# Patient Record
Sex: MALE | Race: WHITE | URBAN - METROPOLITAN AREA
[De-identification: names, ages, dates, MRNs, and addresses within clinical notes are randomized per-mention and may not be internally consistent; named-entity substitution may affect disease eponyms.]

---

## 2017-02-27 ENCOUNTER — DOCUMENTATION ONLY (OUTPATIENT)
Dept: LAB | Facility: CLINIC | Age: 82
End: 2017-02-27

## 2017-02-27 DIAGNOSIS — E11.65 TYPE 2 DIABETES MELLITUS WITH HYPERGLYCEMIA, WITH LONG-TERM CURRENT USE OF INSULIN (H): Primary | ICD-10-CM

## 2017-02-27 DIAGNOSIS — Z79.4 TYPE 2 DIABETES MELLITUS WITH HYPERGLYCEMIA, WITH LONG-TERM CURRENT USE OF INSULIN (H): Primary | ICD-10-CM

## 2017-02-27 NOTE — PROGRESS NOTES
A1C Labs pended. Routed to Dr. Santana to review and order.    Patient has appointment with  2/28/17 for a diabetic check.    Last office visit with Dr. Vizcaino 11/8/16 patient was told to follow up in 3 months for diabetic care.    Bernadine Lux RN,   [facility]

## 2017-02-28 ENCOUNTER — TELEPHONE (OUTPATIENT)
Dept: PHARMACY | Facility: OTHER | Age: 82
End: 2017-02-28

## 2017-02-28 ENCOUNTER — OFFICE VISIT (OUTPATIENT)
Dept: PEDIATRICS | Facility: CLINIC | Age: 82
End: 2017-02-28
Payer: COMMERCIAL

## 2017-02-28 VITALS
SYSTOLIC BLOOD PRESSURE: 123 MMHG | BODY MASS INDEX: 37.58 KG/M2 | HEIGHT: 69 IN | OXYGEN SATURATION: 96 % | WEIGHT: 253.7 LBS | HEART RATE: 68 BPM | TEMPERATURE: 96.6 F | DIASTOLIC BLOOD PRESSURE: 68 MMHG

## 2017-02-28 DIAGNOSIS — Z79.4 TYPE 2 DIABETES MELLITUS WITH HYPOGLYCEMIA WITHOUT COMA, WITH LONG-TERM CURRENT USE OF INSULIN (H): Primary | ICD-10-CM

## 2017-02-28 DIAGNOSIS — E11.65 TYPE 2 DIABETES MELLITUS WITH HYPERGLYCEMIA, WITH LONG-TERM CURRENT USE OF INSULIN (H): ICD-10-CM

## 2017-02-28 DIAGNOSIS — E11.649 TYPE 2 DIABETES MELLITUS WITH HYPOGLYCEMIA WITHOUT COMA, WITH LONG-TERM CURRENT USE OF INSULIN (H): Primary | ICD-10-CM

## 2017-02-28 DIAGNOSIS — Z79.4 TYPE 2 DIABETES MELLITUS WITH HYPERGLYCEMIA, WITH LONG-TERM CURRENT USE OF INSULIN (H): ICD-10-CM

## 2017-02-28 LAB
ANION GAP SERPL CALCULATED.3IONS-SCNC: 4 MMOL/L (ref 3–14)
BUN SERPL-MCNC: 27 MG/DL (ref 7–30)
CALCIUM SERPL-MCNC: 8.5 MG/DL (ref 8.5–10.1)
CHLORIDE SERPL-SCNC: 111 MMOL/L (ref 94–109)
CO2 SERPL-SCNC: 27 MMOL/L (ref 20–32)
CREAT SERPL-MCNC: 1.43 MG/DL (ref 0.66–1.25)
GFR SERPL CREATININE-BSD FRML MDRD: 47 ML/MIN/1.7M2
GLUCOSE SERPL-MCNC: 56 MG/DL (ref 70–99)
HBA1C MFR BLD: 6.4 % (ref 4.3–6)
POTASSIUM SERPL-SCNC: 4.1 MMOL/L (ref 3.4–5.3)
SODIUM SERPL-SCNC: 142 MMOL/L (ref 133–144)

## 2017-02-28 PROCEDURE — 83036 HEMOGLOBIN GLYCOSYLATED A1C: CPT | Performed by: NURSE PRACTITIONER

## 2017-02-28 PROCEDURE — 99213 OFFICE O/P EST LOW 20 MIN: CPT | Performed by: INTERNAL MEDICINE

## 2017-02-28 PROCEDURE — 80048 BASIC METABOLIC PNL TOTAL CA: CPT | Performed by: NURSE PRACTITIONER

## 2017-02-28 PROCEDURE — 36415 COLL VENOUS BLD VENIPUNCTURE: CPT | Performed by: NURSE PRACTITIONER

## 2017-02-28 RX ORDER — METFORMIN HYDROCHLORIDE 750 MG/1
750 TABLET, EXTENDED RELEASE ORAL
Qty: 180 TABLET | Refills: 3
Start: 2017-02-28 | End: 2017-11-01

## 2017-02-28 NOTE — PROGRESS NOTES
"  SUBJECTIVE:                                                    Alex Armenta is a 82 year old male who presents to clinic today for the following health issues:  =    Diabetes Follow-up    Patient is checking blood sugars: twice daily.    Blood sugar testing frequency justification: Uncontrolled diabetes  Results are as follows: Pt. States it will vary. Pt. States if evening it is 120 and afternoon it will be higher due to high intake of carbs. Pt. States there is times when it is in the 70's.    Diabetic concerns: None and low blood sugar several less than 70 in the past few weeks     Symptoms of hypoglycemia (low blood sugar): shaky, dizzy, blurred vision, confusion, Symptoms occur if sugars < 80.     Paresthesias (numbness or burning in feet) or sores: Yes pt. States it will vary and be numb sometimes      Date of last diabetic eye exam: April 2016       Amount of exercise or physical activity: 6-7 days/week for an average of 45-60 minutes    Problems taking medications regularly: Yes,  Pt. States he takes them just forgetting to take them on time     Medication side effects: Pt. States he thinks it affects his hearing   Diet: regular (no restrictions), pt. States he does try to monitor carbs and sugars      HPI: My first concern on this patient was that his  Blood sugar this morning is 56. He was not symptomatic. He took his insulin this morning but has not eaten breakfast because of the scheduled blood draw I immediately had the patient eat breakfast.     He came in today for advice on his diabetes management as his doctors is on leave.             OBJECTIVE:                                                    /68 (BP Location: Right arm, Patient Position: Chair, Cuff Size: Adult Large)  Pulse 68  Temp 96.6  F (35.9  C) (Oral)  Ht 5' 8.75\" (1.746 m)  Wt 253 lb 11.2 oz (115.1 kg)  SpO2 96%  BMI 37.74 kg/m2  Body mass index is 37.74 kg/(m^2).      Diagnostic Test Results:  Results for orders placed or " performed in visit on 02/28/17 (from the past 24 hour(s))   Hemoglobin A1c   Result Value Ref Range    Hemoglobin A1C 6.4 (H) 4.3 - 6.0 %   Basic metabolic panel   Result Value Ref Range    Sodium 142 133 - 144 mmol/L    Potassium 4.1 3.4 - 5.3 mmol/L    Chloride 111 (H) 94 - 109 mmol/L    Carbon Dioxide 27 20 - 32 mmol/L    Anion Gap 4 3 - 14 mmol/L    Glucose 56 (L) 70 - 99 mg/dL    Urea Nitrogen 27 7 - 30 mg/dL    Creatinine 1.43 (H) 0.66 - 1.25 mg/dL    GFR Estimate 47 (L) >60 mL/min/1.7m2    GFR Estimate If Black 57 (L) >60 mL/min/1.7m2    Calcium 8.5 8.5 - 10.1 mg/dL        ASSESSMENT/PLAN:                                                        ICD-10-CM    1. Type 2 diabetes mellitus with hypoglycemia without coma, with long-term current use of insulin (H) E11.649     Z79.4    2. Type 2 diabetes mellitus with hyperglycemia, with long-term current use of insulin (H) E11.65 metFORMIN (GLUCOPHAGE-XR) 750 MG 24 hr tablet    Z79.4      Need to improve his diabetes management because he's having episodes of hypoglycemia. I'm decreasing the metformin to once a day and decreasing the dose of Humalog to 10 units before meals rather than 15. On the chart it says he takes Lantus 90 but the patient informed me he only takes 80. Referral made to medical therapy management. More than 50% of the time was spent on education for the above problems and management plans                Riki Santana MD  [facility]

## 2017-02-28 NOTE — TELEPHONE ENCOUNTER
MTM referral from: [facility] visit (referral by provider)    MTM referral outreach attempt #1 on February 28, 2017 at 12:35 PM      Outcome: Patient is not interested at this time because he feels it is not necessary at this time, will route to MTM Pharmacist/Provider as an FYI. Thank you for the referral.     Dennise Lai, MTM Coordinator Intern

## 2017-02-28 NOTE — MR AVS SNAPSHOT
After Visit Summary   2/28/2017    Alex Armenta    MRN: [id number]           Patient Information     Date Of Birth          1935        Visit Information        Provider Department      2/28/2017 9:20 AM Riki Santana MD [facility]        Today's Diagnoses     Type 2 diabetes mellitus with hypoglycemia without coma, with long-term current use of insulin (H)    -  1    Type 2 diabetes mellitus with hyperglycemia, with long-term current use of insulin (H)           Follow-ups after your visit        Additional Services     MED THERAPY MANAGE REFERRAL       Your provider has referred you to: **[facility] Scheduling (numerous locations) [phone]   [email or web address]  [facility]: [facility] [phone]   [email or web address]    Reason for Referral: Diabetes    The [facility] department will contact you to schedule an appointment.  You may also schedule the appointment by calling [phone].  For [facility] patients, please call [phone] to confirm/schedule your appointment on the next business day.    This service is designed to help you get the most from your medications.  A specially trained Pharmacist will work closely with you and your providers to solve any questions, concerns, issues or problems related to your medications.    Please bring all of your prescription and non-prescription medications (such as vitamins, over-the-counter medications, and herbals) or a detailed medication list to your appointment.    If you have a glucose meter or other home monitoring information, please also bring this to your appointment (i.e. blood glucose log, blood pressure log, pain log, etc.).                  Who to contact     If you have questions or need follow up information about today's  "clinic visit or your schedule please contact [facility] directly at [phone].  Normal or non-critical lab and imaging results will be communicated to you by [employer], letter or phone within 4 business days after the clinic has received the results. If you do not hear from us within 7 days, please contact the clinic through [employer] or phone. If you have a critical or abnormal lab result, we will notify you by phone as soon as possible.  Submit refill requests through [employer] or call your pharmacy and they will forward the refill request to us. Please allow 3 business days for your refill to be completed.          Additional Information About Your Visit        [employer] Information     [employer] is an electronic gateway that provides easy, online access to your medical records. With [employer], you can request a clinic appointment, read your test results, renew a prescription or communicate with your care team.     To sign up for [employer] visit the website at [email or web address]   You will be asked to enter the access code listed below, as well as some personal information. Please follow the directions to create your username and password.     Your access code is: [email or web address]  Expires: 2017 10:40 AM     Your access code will  in 90 days. If you need help or a new code, please contact your [facility] or call [phone] for assistance.        Care EveryWhere ID     This is your Care EveryWhere ID. This could be used by other organizations to access your [place] medical records  [id number]        Your Vitals Were     Pulse Temperature Height Pulse Oximetry BMI (Body Mass Index)       68 96.6  F (35.9  C) (Oral) 5' 8.75\" (1.746 m) 96% 37.74 kg/m2        Blood Pressure from Last 3 Encounters:   17 123/68   16 112/68   16 120/56    Weight from Last 3 Encounters:   17 253 lb 11.2 oz (115.1 kg)   16 249 lb (112.9 kg)   16 " 251 lb 3.2 oz (113.9 kg)              We Performed the Following     MED THERAPY MANAGE REFERRAL          Today's Medication Changes          These changes are accurate as of: 2/28/17 10:40 AM.  If you have any questions, ask your nurse or doctor.               These medicines have changed or have updated prescriptions.        Dose/Directions    metFORMIN 750 MG 24 hr tablet   Commonly known as:  GLUCOPHAGE-XR   This may have changed:  when to take this   Used for:  Type 2 diabetes mellitus with hyperglycemia, with long-term current use of insulin (H)   Changed by:  Riki Santana MD        Dose:  750 mg   Take 1 tablet (750 mg) by mouth daily (with breakfast)   Quantity:  180 tablet   Refills:  3            Where to get your medicines      Some of these will need a paper prescription and others can be bought over the counter.  Ask your nurse if you have questions.     You don't need a prescription for these medications     metFORMIN 750 MG 24 hr tablet                Primary Care Provider Office Phone # Fax #    Anibal Vizcaino [phone]       [facility] [address] [place] [id number]        Thank you!     Thank you for choosing [facility]  for your care. Our goal is always to provide you with excellent care. Hearing back from our patients is one way we can continue to improve our services. Please take a few minutes to complete the written survey that you may receive in the mail after your visit with us. Thank you!             Your Updated Medication List - Protect others around you: Learn how to safely use, store and throw away your medicines at www.disposemymeds.org.          This list is accurate as of: 2/28/17 10:40 AM.  Always use your most recent med list.                   Brand Name Dispense Instructions for use    aspirin 81 MG tablet     100 tablet    Take 1 tablet by mouth daily.       BLOOD GLUCOSE TEST STRIPS STRP     100 Strip    One  Touch Ultra-test 3 times daily (on insulin)       enalapril 20 MG tablet    VASOTEC    90 tablet    TAKE 1 TABLET BY MOUTH ONCE DAILY       * insulin lispro 100 UNIT/ML injection    HumaLOG    3 Month    15 units before breakfast, 15 units before lunch, 15 units before dinner       * HumaLOG KWIKpen 100 UNIT/ML injection   Generic drug:  insulin lispro     3 Month    INJECT 15 UNITS BEFORE BREAKFAST, 15 UNITS BEFORE LUNCH, 15 UNITS BEFORE DINNER       insulin pen needle 30G X 8 MM    NOVOFINE    400 each    1 Units 4 times daily       LANTUS SOLOSTAR 100 UNIT/ML injection   Generic drug:  insulin glargine     3 Month    90 units every 24 hours       metFORMIN 750 MG 24 hr tablet    GLUCOPHAGE-XR    180 tablet    Take 1 tablet (750 mg) by mouth daily (with breakfast)       Multi-vitamin Tabs tablet      Take 1 tablet by mouth daily       * blood glucose monitoring test strip    no brand specified     Use 3 times a day to test blood sugars.       * ONE TOUCH ULTRA test strip   Generic drug:  blood glucose monitoring     100 each    USE 1 STRIP TO TEST 4 TIMES DAILY       order for DME     300 each    Equipment being ordered: Lancets of choice for diabetic testing       * order for DME     1 Units    Equipment being ordered:   New home glucometer of choice Test strips for new glucometer for three times daily monitoring--on insulin therapy  Anibal Vizcaino MD       * order for DME     400 strip    Equipment being ordered: One Touch Ultra Glucometer Test Strips ( on insulin therapy )  To be used four times daily for glucose monitoring.       * order for DME     300 Units    Equipment being ordered: DELICA  Lancets--uses three time daily       pravastatin 10 MG tablet    PRAVACHOL    90 tablet    TAKE 1 TABLET BY MOUTH ONCE DAILY       * Notice:  This list has 7 medication(s) that are the same as other medications prescribed for you. Read the directions carefully, and ask your doctor or other care provider to review them  with you.

## 2017-05-03 ENCOUNTER — TELEPHONE (OUTPATIENT)
Dept: FAMILY MEDICINE | Facility: CLINIC | Age: 82
End: 2017-05-03

## 2017-05-03 NOTE — TELEPHONE ENCOUNTER
Reason for call:  Form  Reason for Call:  Form, our goal is to have forms completed with 72 hours, however, some forms may require a visit or additional information.    Type of letter, form or note:  medical    Who is the form from?: Patient    Where did the form come from: Patient or family brought in       What clinic location was the form placed at?: [facility] - [phone]    Where the form was placed: [facility]    What number is listed as a contact on the form?: Phone [phone]       Additional comments: form to be filled out and mailed back to [place] [employer]    Call taken on 5/3/2017 at 1:03 PM by Janna Escobar

## 2017-05-04 NOTE — TELEPHONE ENCOUNTER
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 Using starting date for insulin 7/2008 based on records. Given 4 years with n o reported incidents.    Anibal Vizcaino MD

## 2017-05-23 ENCOUNTER — TELEPHONE (OUTPATIENT)
Dept: FAMILY MEDICINE | Facility: CLINIC | Age: 82
End: 2017-05-23

## 2017-05-23 DIAGNOSIS — E78.5 HYPERLIPIDEMIA LDL GOAL <100: ICD-10-CM

## 2017-05-23 NOTE — TELEPHONE ENCOUNTER
Pravastatin sodium 10 mg tab     Last Written Prescription Date: 05-03-16  Last Fill Quantity: 90, # refills: 3  Last Office Visit with G, [facility] or [facility] prescribing provider: 11-8-16       Lab Results   Component Value Date    CHOL 105 11/08/2016     Lab Results   Component Value Date    HDL 38 11/08/2016     Lab Results   Component Value Date    LDL 48 11/08/2016     Lab Results   Component Value Date    TRIG 97 11/08/2016     Lab Results   Component Value Date    CHOLHDLRATIO 3.0 08/11/2015

## 2017-05-25 RX ORDER — PRAVASTATIN SODIUM 10 MG
10 TABLET ORAL DAILY
Qty: 90 TABLET | Refills: 0 | Status: SHIPPED | OUTPATIENT
Start: 2017-05-25 | End: 2017-05-25

## 2017-05-25 RX ORDER — PRAVASTATIN SODIUM 10 MG
10 TABLET ORAL DAILY
Qty: 90 TABLET | Refills: 2 | Status: SHIPPED | OUTPATIENT
Start: 2017-05-25 | End: 2018-07-16

## 2017-05-25 NOTE — TELEPHONE ENCOUNTER
Medication is being filled for 1 time refill only due to:  Patient needs to be seen because due for Diabetic OV.     Please assist in scheduling.  Katina Stokes RN, BSN

## 2017-05-25 NOTE — TELEPHONE ENCOUNTER
Patient informed.  He stated he had a diabetic follow up appt with Dr. Santana in  on 2/28/2017     Provider, please review and send new Rx if appropriate.

## 2017-06-09 ENCOUNTER — TELEPHONE (OUTPATIENT)
Dept: FAMILY MEDICINE | Facility: CLINIC | Age: 82
End: 2017-06-09

## 2017-06-09 DIAGNOSIS — E11.65 TYPE 2 DIABETES MELLITUS WITH HYPERGLYCEMIA, WITH LONG-TERM CURRENT USE OF INSULIN (H): Primary | ICD-10-CM

## 2017-06-09 DIAGNOSIS — I10 HYPERTENSION GOAL BP (BLOOD PRESSURE) < 140/80: ICD-10-CM

## 2017-06-09 DIAGNOSIS — Z79.4 TYPE 2 DIABETES MELLITUS WITH HYPERGLYCEMIA, WITH LONG-TERM CURRENT USE OF INSULIN (H): Primary | ICD-10-CM

## 2017-06-09 RX ORDER — ENALAPRIL MALEATE 20 MG/1
20 TABLET ORAL DAILY
Qty: 90 TABLET | Refills: 2 | Status: CANCELLED | OUTPATIENT
Start: 2017-06-09

## 2017-06-09 NOTE — TELEPHONE ENCOUNTER
insulin pen needle (NOVOFINE) 30G X 8 MM         Last Written Prescription Date: 1/16/2015  Last Fill Quantity: 400each, # refills: 3  Last Office Visit with G, P or [facility] prescribing provider:  11/8/2016        BP Readings from Last 3 Encounters:   02/28/17 123/68   11/08/16 112/68   07/07/16 120/56     Lab Results   Component Value Date    MICROL 184 11/08/2016     Lab Results   Component Value Date    UMALCR 137.31 11/08/2016     Creatinine   Date Value Ref Range Status   02/28/2017 1.43 (H) 0.66 - 1.25 mg/dL Final   ]  GFR Estimate   Date Value Ref Range Status   02/28/2017 47 (L) >60 mL/min/1.7m2 Final     Comment:     Non  GFR Calc   11/08/2016 54 (L) >60 mL/min/1.7m2 Final     Comment:     Non  GFR Calc   07/07/2016 48 (L) >60 mL/min/1.7m2 Final     Comment:     Non  GFR Calc     GFR Estimate If Black   Date Value Ref Range Status   02/28/2017 57 (L) >60 mL/min/1.7m2 Final     Comment:      GFR Calc   11/08/2016 65 >60 mL/min/1.7m2 Final     Comment:      GFR Calc   07/07/2016 58 (L) >60 mL/min/1.7m2 Final     Comment:      GFR Calc     Lab Results   Component Value Date    CHOL 105 11/08/2016     Lab Results   Component Value Date    HDL 38 11/08/2016     Lab Results   Component Value Date    LDL 48 11/08/2016     Lab Results   Component Value Date    TRIG 97 11/08/2016     Lab Results   Component Value Date    CHOLHDLRATIO 3.0 08/11/2015     Lab Results   Component Value Date    AST 16 11/08/2016     Lab Results   Component Value Date    ALT 24 11/08/2016     Lab Results   Component Value Date    A1C 6.4 02/28/2017    A1C 6.1 11/08/2016    A1C 7.0 07/07/2016    A1C 6.6 03/17/2016    A1C 6.6 11/23/2015     Potassium   Date Value Ref Range Status   02/28/2017 4.1 3.4 - 5.3 mmol/L Final

## 2017-06-09 NOTE — TELEPHONE ENCOUNTER
enalapril (VASOTEC) 20 MG tablet      Last Written Prescription Date: 08-12-16  Last Fill Quantity: 90, # refills: 2  Last Office Visit with G, P or [facility] prescribing provider: 11-08-16       Potassium   Date Value Ref Range Status   02/28/2017 4.1 3.4 - 5.3 mmol/L Final     Creatinine   Date Value Ref Range Status   02/28/2017 1.43 (H) 0.66 - 1.25 mg/dL Final     BP Readings from Last 3 Encounters:   02/28/17 123/68   11/08/16 112/68   07/07/16 120/56

## 2017-06-12 RX ORDER — ENALAPRIL MALEATE 20 MG/1
20 TABLET ORAL DAILY
Qty: 90 TABLET | Refills: 2 | Status: SHIPPED | OUTPATIENT
Start: 2017-06-12 | End: 2018-04-02

## 2017-06-12 NOTE — TELEPHONE ENCOUNTER
enalapril (VASOTEC) 20 MG tablet    Potassium   Date Value Ref Range Status   02/28/2017 4.1 3.4 - 5.3 mmol/L Final     Creatinine   Date Value Ref Range Status   02/28/2017 1.43 (H) 0.66 - 1.25 mg/dL Final     BP Readings from Last 3 Encounters:   02/28/17 123/68   11/08/16 112/68   07/07/16 120/56     Routing refill request to provider for review/approval because:  Labs out of range:  Creatinine  Katina Stokes RN, BSN       insulin pen needle (NOVOFINE) 30G X 8 MM       Prescription approved per [facility] Refill Protocol.  Katina Stokes RN, BSN

## 2017-06-12 NOTE — TELEPHONE ENCOUNTER
Pt calling for follow up on refills of below insulin needle and the Enalapril please advise and contact pt in regards. [phone]

## 2017-07-12 DIAGNOSIS — E11.21 TYPE 2 DIABETES MELLITUS WITH DIABETIC NEPHROPATHY, WITHOUT LONG-TERM CURRENT USE OF INSULIN (H): ICD-10-CM

## 2017-07-12 NOTE — TELEPHONE ENCOUNTER
BLOOD GLUCOSE TEST STRIPS STRP      Last Written Prescription Date: 04/07/10  Last Fill Quantity: 100,  # refills: prn   Last Office Visit with G, P or [facility] prescribing provider: 02/28/17

## 2017-07-12 NOTE — TELEPHONE ENCOUNTER
ONE TOUCH ULTRA test strip         Last Written Prescription Date: 12/5/2016  Last Fill Quantity: 100, # refills: 2  Last Office Visit with G, P or [facility] prescribing provider:  11/8/2016        BP Readings from Last 3 Encounters:   02/28/17 123/68   11/08/16 112/68   07/07/16 120/56     Lab Results   Component Value Date    MICROL 184 11/08/2016     Lab Results   Component Value Date    UMALCR 137.31 11/08/2016     Creatinine   Date Value Ref Range Status   02/28/2017 1.43 (H) 0.66 - 1.25 mg/dL Final   ]  GFR Estimate   Date Value Ref Range Status   02/28/2017 47 (L) >60 mL/min/1.7m2 Final     Comment:     Non  GFR Calc   11/08/2016 54 (L) >60 mL/min/1.7m2 Final     Comment:     Non  GFR Calc   07/07/2016 48 (L) >60 mL/min/1.7m2 Final     Comment:     Non  GFR Calc     GFR Estimate If Black   Date Value Ref Range Status   02/28/2017 57 (L) >60 mL/min/1.7m2 Final     Comment:      GFR Calc   11/08/2016 65 >60 mL/min/1.7m2 Final     Comment:      GFR Calc   07/07/2016 58 (L) >60 mL/min/1.7m2 Final     Comment:      GFR Calc     Lab Results   Component Value Date    CHOL 105 11/08/2016     Lab Results   Component Value Date    HDL 38 11/08/2016     Lab Results   Component Value Date    LDL 48 11/08/2016     Lab Results   Component Value Date    TRIG 97 11/08/2016     Lab Results   Component Value Date    CHOLHDLRATIO 3.0 08/11/2015     Lab Results   Component Value Date    AST 16 11/08/2016     Lab Results   Component Value Date    ALT 24 11/08/2016     Lab Results   Component Value Date    A1C 6.4 02/28/2017    A1C 6.1 11/08/2016    A1C 7.0 07/07/2016    A1C 6.6 03/17/2016    A1C 6.6 11/23/2015     Potassium   Date Value Ref Range Status   02/28/2017 4.1 3.4 - 5.3 mmol/L Final

## 2017-07-14 NOTE — TELEPHONE ENCOUNTER
BLOOD GLUCOSE TEST STRIPS STRP      Prescription approved per [facility] Refill Protocol.  Katina Stokes RN, BSN

## 2017-08-02 NOTE — PROGRESS NOTES
SUBJECTIVE:                                                    Alex Armenta is a 82 year old male who presents to clinic today for the following health issues:      Diabetes Follow-up--reviewed his current insulin doses. Is on a reduced dose of Metformin. This is due to renal insufficiency. Tolerating medications well       Patient is checking blood sugars: twice daily.    Blood sugar testing frequency justification: Frequent hypoglycemia  Results are as follows:       Morning, noon, evening        Diabetic concerns: None     Symptoms of hypoglycemia (low blood sugar): none     Paresthesias (numbness or burning in feet) or sores: Yes in fingers, little bit in feet     Date of last diabetic eye exam: April    Hyperlipidemia Follow-Up      Rate your low fat/cholesterol diet?: fair    Taking statin?  Yes, Does get some muscle aches    Other lipid medications/supplements?:  none    Hypertension Follow-up      Outpatient blood pressures are not being checked.    Low Salt Diet: not monitoring salt      Chronic Kidney Disease Follow-up      Current NSAID use?  No          Amount of exercise or physical activity: 4-5 days/week for an average of 45-60 minutes    Problems taking medications regularly: No    Medication side effects: none  Diet: Diabetic diet, no added salt.          Problem list and histories reviewed & adjusted, as indicated.  Additional history: as documented        Reviewed and updated as needed this visit by clinical staff     Reviewed and updated as needed this visit by Provider         ROS:  C: NEGATIVE for fever, chills, change in weight  CONSTITUTIONAL:POSITIVE  for fatigue likely related to chronic anemia secondary to myelodysplastic syndrome  I: NEGATIVE for worrisome rashes, moles or lesions  E: NEGATIVE for vision changes or irritation  E/M: NEGATIVE for ear, mouth and throat problems  R: NEGATIVE for significant cough or SOB  RESP: Reports some exertional fatigue and shortness of breath without  "chest pain. Feel this is likely secondary to his anemia.  B: NEGATIVE for masses, tenderness or discharge  CV: NEGATIVE for chest pain, palpitations or peripheral edema  CV: Previously noted heart murmur but with echocardiogram 5 years ago showing no valvular abnormalities. No exertional chest pain.  GI: NEGATIVE for nausea, abdominal pain, heartburn, or change in bowel habits  : NEGATIVE for frequency, dysuria, or hematuria   male : His chronic renal disease, stage III B.  M: NEGATIVE for significant arthralgias or myalgia  N: NEGATIVE for weakness, dizziness or paresthesias  NEURO: Patient is hard of hearing but otherwise mentation appears normal and cognition appears normal. No suggestion of cognitive decline.  E: NEGATIVE for temperature intolerance, skin/hair changes  ENDOCRINE: On insulin, basal and pre-meal  H: NEGATIVE for bleeding problems  HEME/ALLERGY/IMMUNE: Long-term anemia with macrocytosis, suggestive of a myelodysplastic problem. He has seen a hematologist in the past but did not complete a bone marrow study. At each visit is offered to be referred back for reevaluation.  P: NEGATIVE for changes in mood or affect  PSYCHIATRIC: Some mild depression due to the loss of his wife in number of years ago. Patient is still functioning and not requiring medications.    OBJECTIVE:                                                    /70  Pulse 78  Temp 98.2  F (36.8  C) (Temporal)  Resp 12  Ht 5' 8.5\" (1.74 m)  Wt 251 lb 1.6 oz (113.9 kg)  BMI 37.62 kg/m2  Body mass index is 37.62 kg/(m^2).  GENERAL: alert, no distress, cooperative and pale  EYES: Eyes grossly normal to inspection, extraocular movements - intact, and PERRL  HENT: ear canals- normal; TMs- normal; Nose- normal; Mouth- no ulcers, no lesions  HENT: Significant hearing loss but functioning reasonably well  NECK: no tenderness, no adenopathy, no asymmetry, no masses, no stiffness; thyroid- normal to palpation  NECK: No carotid bruits " noted  RESP: lungs clear to auscultation - no rales, no rhonchi, no wheezes  BREAST: no masses, no tenderness, no nipple discharge, no palpable axillary masses or adenopathy  CV: regular rates and rhythm, normal S1 S2, no S3 or S4, no irregular beats, no bruits heard and grade 1-2/6 soft systolic murmur heard best over the base of the heart and left sternal border  ABDOMEN: soft, no tenderness, no  hepatosplenomegaly, no masses, normal bowel sounds  MS: extremities- no gross deformities noted, no edema  SKIN: no suspicious lesions, no rashes  NEURO: strength and tone- normal, sensory exam- grossly normal, mentation- intact, speech- normal, reflexes- symmetric  BACK: no CVA tenderness, no paralumbar tenderness  PSYCH: Alert and oriented times 3; speech- coherent , normal rate and volume; able to articulate logical thoughts, able to abstract reason, no tangential thoughts, no hallucinations or delusions, affect- normal  LYMPHATICS: ant. cervical- normal, post. cervical- normal, axillary- normal, supraclavicular- normal, inguinal- normal    Diagnostic test results:  Diagnostic Test Results:  Results for orders placed or performed in visit on 08/04/17 (from the past 24 hour(s))   CBC with platelets   Result Value Ref Range    WBC 9.9 4.0 - 11.0 10e9/L    RBC Count 2.62 (L) 4.4 - 5.9 10e12/L    Hemoglobin 9.2 (L) 13.3 - 17.7 g/dL    Hematocrit 28.2 (L) 40.0 - 53.0 %     (H) 78 - 100 fl    MCH 35.1 (H) 26.5 - 33.0 pg    MCHC 32.6 31.5 - 36.5 g/dL    RDW 17.8 (H) 10.0 - 15.0 %    Platelet Count 195 150 - 450 10e9/L   Lipid panel reflex to direct LDL   Result Value Ref Range    Cholesterol 91 <200 mg/dL    Triglycerides 72 <150 mg/dL    HDL Cholesterol 34 (L) >39 mg/dL    LDL Cholesterol Calculated 43 <100 mg/dL    Non HDL Cholesterol 57 <130 mg/dL   Comprehensive metabolic panel   Result Value Ref Range    Sodium 142 133 - 144 mmol/L    Potassium 4.9 3.4 - 5.3 mmol/L    Chloride 111 (H) 94 - 109 mmol/L    Carbon  Dioxide 21 20 - 32 mmol/L    Anion Gap 10 3 - 14 mmol/L    Glucose 68 (L) 70 - 99 mg/dL    Urea Nitrogen 32 (H) 7 - 30 mg/dL    Creatinine 1.40 (H) 0.66 - 1.25 mg/dL    GFR Estimate 49 (L) >60 mL/min/1.7m2    GFR Estimate If Black 59 (L) >60 mL/min/1.7m2    Calcium 8.8 8.5 - 10.1 mg/dL    Bilirubin Total 0.9 0.2 - 1.3 mg/dL    Albumin 3.9 3.4 - 5.0 g/dL    Protein Total 7.3 6.8 - 8.8 g/dL    Alkaline Phosphatase 56 40 - 150 U/L    ALT 21 0 - 70 U/L    AST 13 0 - 45 U/L   Hemoglobin A1c   Result Value Ref Range    Hemoglobin A1C 6.3 (H) 4.3 - 6.0 %   TSH with free T4 reflex   Result Value Ref Range    TSH 2.14 0.40 - 4.00 mU/L   Albumin Random Urine Quantitative   Result Value Ref Range    Creatinine Urine 130 mg/dL    Albumin Urine mg/L 100 mg/L    Albumin Urine mg/g Cr 76.54 (H) 0 - 17 mg/g Cr          ASSESSMENT/PLAN:                                                    1. Type 2 diabetes mellitus with hyperglycemia, with long-term current use of insulin (H)   A1c likely slightly lower than actual value due to the anemia. Continue current medications. Patient is 82 years old.  - LANTUS SOLOSTAR 100 UNIT/ML soln; 80 units every 24 hours  - Lipid panel reflex to direct LDL  - Comprehensive metabolic panel  - Hemoglobin A1c    2. MDS (myelodysplastic syndrome) (H)   Discussed again of the likely cause of the anemia. Again discussed and offered reevaluation through the hematologist and oncologist. Patient currently declines further study. He is aware that this is a bone marrow problem most likely and that there may be some treatment after more definite diagnosis that I am not aware of. We'll continue to discuss each visit.  - CBC with platelets    3. CKD (chronic kidney disease) stage 3, GFR 30-59 ml/min   Stable serum creatinine and GFR  - CBC with platelets  - Comprehensive metabolic panel  - Albumin Random Urine Quantitative    4. Hypertension goal BP (blood pressure) < 140/80   Blood pressure appears to be  well-maintained  - Comprehensive metabolic panel    5. Hyperlipidemia LDL goal <100   Continue current medications  - Lipid panel reflex to direct LDL  - Comprehensive metabolic panel  - TSH with free T4 reflex    6. Other fatigue    Fatigue is likely from his anemia. Checking thyroid status.  - TSH with free T4 reflex      Follow up with Provider - Follow-up in clinic if 3 months.   See Patient Instructions    The patient understood the rational for the diagnosis and treatment plan. All questions were answered to best of my ability and the patient's satisfaction.    Note: Chart documentation done in part with Dragon Voice Recognition software. Although reviewed after completion, some word and grammatical errors may remain.  Please consider this when interpreting information in this chart.      Anibal Vizcaino MD  [facility]

## 2017-08-04 ENCOUNTER — OFFICE VISIT (OUTPATIENT)
Dept: FAMILY MEDICINE | Facility: CLINIC | Age: 82
End: 2017-08-04
Payer: COMMERCIAL

## 2017-08-04 VITALS
HEIGHT: 69 IN | SYSTOLIC BLOOD PRESSURE: 128 MMHG | HEART RATE: 78 BPM | RESPIRATION RATE: 12 BRPM | BODY MASS INDEX: 37.19 KG/M2 | TEMPERATURE: 98.2 F | DIASTOLIC BLOOD PRESSURE: 70 MMHG | WEIGHT: 251.1 LBS

## 2017-08-04 DIAGNOSIS — E78.5 HYPERLIPIDEMIA LDL GOAL <100: ICD-10-CM

## 2017-08-04 DIAGNOSIS — R53.83 OTHER FATIGUE: ICD-10-CM

## 2017-08-04 DIAGNOSIS — E11.65 TYPE 2 DIABETES MELLITUS WITH HYPERGLYCEMIA, WITH LONG-TERM CURRENT USE OF INSULIN (H): Primary | ICD-10-CM

## 2017-08-04 DIAGNOSIS — Z79.4 TYPE 2 DIABETES MELLITUS WITH HYPERGLYCEMIA, WITH LONG-TERM CURRENT USE OF INSULIN (H): Primary | ICD-10-CM

## 2017-08-04 DIAGNOSIS — N18.30 CKD (CHRONIC KIDNEY DISEASE) STAGE 3, GFR 30-59 ML/MIN (H): ICD-10-CM

## 2017-08-04 DIAGNOSIS — I10 HYPERTENSION GOAL BP (BLOOD PRESSURE) < 140/80: ICD-10-CM

## 2017-08-04 DIAGNOSIS — D46.9 MDS (MYELODYSPLASTIC SYNDROME) (H): ICD-10-CM

## 2017-08-04 PROBLEM — E66.01 MORBID OBESITY (H): Status: ACTIVE | Noted: 2017-08-04

## 2017-08-04 LAB
ALBUMIN SERPL-MCNC: 3.9 G/DL (ref 3.4–5)
ALP SERPL-CCNC: 56 U/L (ref 40–150)
ALT SERPL W P-5'-P-CCNC: 21 U/L (ref 0–70)
ANION GAP SERPL CALCULATED.3IONS-SCNC: 10 MMOL/L (ref 3–14)
AST SERPL W P-5'-P-CCNC: 13 U/L (ref 0–45)
BILIRUB SERPL-MCNC: 0.9 MG/DL (ref 0.2–1.3)
BUN SERPL-MCNC: 32 MG/DL (ref 7–30)
CALCIUM SERPL-MCNC: 8.8 MG/DL (ref 8.5–10.1)
CHLORIDE SERPL-SCNC: 111 MMOL/L (ref 94–109)
CHOLEST SERPL-MCNC: 91 MG/DL
CO2 SERPL-SCNC: 21 MMOL/L (ref 20–32)
CREAT SERPL-MCNC: 1.4 MG/DL (ref 0.66–1.25)
CREAT UR-MCNC: 130 MG/DL
ERYTHROCYTE [DISTWIDTH] IN BLOOD BY AUTOMATED COUNT: 17.8 % (ref 10–15)
GFR SERPL CREATININE-BSD FRML MDRD: 49 ML/MIN/1.7M2
GLUCOSE SERPL-MCNC: 68 MG/DL (ref 70–99)
HBA1C MFR BLD: 6.3 % (ref 4.3–6)
HCT VFR BLD AUTO: 28.2 % (ref 40–53)
HDLC SERPL-MCNC: 34 MG/DL
HGB BLD-MCNC: 9.2 G/DL (ref 13.3–17.7)
LDLC SERPL CALC-MCNC: 43 MG/DL
MCH RBC QN AUTO: 35.1 PG (ref 26.5–33)
MCHC RBC AUTO-ENTMCNC: 32.6 G/DL (ref 31.5–36.5)
MCV RBC AUTO: 108 FL (ref 78–100)
MICROALBUMIN UR-MCNC: 100 MG/L
MICROALBUMIN/CREAT UR: 76.54 MG/G CR (ref 0–17)
NONHDLC SERPL-MCNC: 57 MG/DL
PLATELET # BLD AUTO: 195 10E9/L (ref 150–450)
POTASSIUM SERPL-SCNC: 4.9 MMOL/L (ref 3.4–5.3)
PROT SERPL-MCNC: 7.3 G/DL (ref 6.8–8.8)
RBC # BLD AUTO: 2.62 10E12/L (ref 4.4–5.9)
SODIUM SERPL-SCNC: 142 MMOL/L (ref 133–144)
TRIGL SERPL-MCNC: 72 MG/DL
TSH SERPL DL<=0.005 MIU/L-ACNC: 2.14 MU/L (ref 0.4–4)
WBC # BLD AUTO: 9.9 10E9/L (ref 4–11)

## 2017-08-04 PROCEDURE — 85027 COMPLETE CBC AUTOMATED: CPT | Performed by: FAMILY MEDICINE

## 2017-08-04 PROCEDURE — 82043 UR ALBUMIN QUANTITATIVE: CPT | Performed by: FAMILY MEDICINE

## 2017-08-04 PROCEDURE — 99207 C PAF COMPLETED  NO CHARGE: CPT | Performed by: FAMILY MEDICINE

## 2017-08-04 PROCEDURE — 99214 OFFICE O/P EST MOD 30 MIN: CPT | Performed by: FAMILY MEDICINE

## 2017-08-04 PROCEDURE — 36415 COLL VENOUS BLD VENIPUNCTURE: CPT | Performed by: FAMILY MEDICINE

## 2017-08-04 PROCEDURE — 83036 HEMOGLOBIN GLYCOSYLATED A1C: CPT | Performed by: FAMILY MEDICINE

## 2017-08-04 PROCEDURE — 80061 LIPID PANEL: CPT | Performed by: FAMILY MEDICINE

## 2017-08-04 PROCEDURE — 80053 COMPREHEN METABOLIC PANEL: CPT | Performed by: FAMILY MEDICINE

## 2017-08-04 PROCEDURE — 84443 ASSAY THYROID STIM HORMONE: CPT | Performed by: FAMILY MEDICINE

## 2017-08-04 RX ORDER — INSULIN GLARGINE 100 [IU]/ML
INJECTION, SOLUTION SUBCUTANEOUS
Start: 2017-08-04 | End: 2018-01-17

## 2017-08-04 ASSESSMENT — PAIN SCALES - GENERAL: PAINLEVEL: NO PAIN (0)

## 2017-08-04 NOTE — MR AVS SNAPSHOT
After Visit Summary   8/4/2017    Alex Armenta    MRN: [id number]           Patient Information     Date Of Birth          1935        Visit Information        Provider Department      8/4/2017 9:40 AM Anibal Vizcaino MD [facility]        Today's Diagnoses     Type 2 diabetes mellitus with hyperglycemia, with long-term current use of insulin (H)    -  1    MDS (myelodysplastic syndrome) (H)        CKD (chronic kidney disease) stage 3, GFR 30-59 ml/min        Hypertension goal BP (blood pressure) < 140/80        Hyperlipidemia LDL goal <100        Other fatigue          Care Instructions    These are new changes to your current plan of care based on today's visit:    Medications stopped    Medications to be started    Change dose of this medication Check to see the dose of Metformin you are taking--should be 750 mg once daily   New treatments        Follow up appointments:    1.  FOLLOW UP WITH YOUR PRIMARY CARE PROVIDER: 3 month(s) for diabetic follow up     2. FOLLOW UP WITH SPECIALIST: Hematologist possibly to be seen for follow up on the anemia    Anibal Vizcaino MD                Follow-ups after your visit        Follow-up notes from your care team     Return in about 3 months (around 11/4/2017) for Routine Visit, Lab Work.      Who to contact     If you have questions or need follow up information about today's clinic visit or your schedule please contact [facility] directly at [phone].  Normal or non-critical lab and imaging results will be communicated to you by MyChart, letter or phone within 4 business days after the clinic has received the results. If you do not hear from us within 7 days, please contact the clinic through MyChart or phone. If you have a critical or abnormal lab result, we will notify you by phone as soon as possible.  Submit refill requests through [employer] or call your pharmacy and they will forward the refill request to us. Please  "allow 3 business days for your refill to be completed.          Additional Information About Your Visit        MyChart Information     [employer] lets you send messages to your doctor, view your test results, renew your prescriptions, schedule appointments and more. To sign up, go to [email or web address] . Click on \"Log in\" on the left side of the screen, which will take you to the Welcome page. Then click on \"Sign up Now\" on the right side of the page.     You will be asked to enter the access code listed below, as well as some personal information. Please follow the directions to create your username and password.     Your access code is: [email or web address]  Expires: 2017 10:18 AM     Your access code will  in 90 days. If you need help or a new code, please call your [place] clinic or [phone].        Care EveryWhere ID     This is your Care EveryWhere ID. This could be used by other organizations to access your [place] medical records  [id number]        Your Vitals Were     Pulse Temperature Respirations Height BMI (Body Mass Index)       78 98.2  F (36.8  C) (Temporal) 12 5' 8.5\" (1.74 m) 37.62 kg/m2        Blood Pressure from Last 3 Encounters:   17 128/70   17 123/68   16 112/68    Weight from Last 3 Encounters:   17 251 lb 1.6 oz (113.9 kg)   17 253 lb 11.2 oz (115.1 kg)   16 249 lb (112.9 kg)              We Performed the Following     Albumin Random Urine Quantitative     CBC with platelets     Comprehensive metabolic panel     Hemoglobin A1c     Lipid panel reflex to direct LDL     PAF COMPLETED     TSH with free T4 reflex          Today's Medication Changes          These changes are accurate as of: 17 10:18 AM.  If you have any questions, ask your nurse or doctor.               These medicines have changed or have updated prescriptions.        Dose/Directions    LANTUS SOLOSTAR 100 UNIT/ML injection   This may have changed:  additional instructions "   Used for:  Type 2 diabetes mellitus with hyperglycemia, with long-term current use of insulin (H)   Generic drug:  insulin glargine   Changed by:  Anibal Vizcaino MD        80 units every 24 hours   Refills:  0            Where to get your medicines      Some of these will need a paper prescription and others can be bought over the counter.  Ask your nurse if you have questions.     You don't need a prescription for these medications     LANTUS SOLOSTAR 100 UNIT/ML injection                Primary Care Provider Office Phone # Fax #    Anibal Vizcaino [phone]       [facility] [id number] [place] [id number]        Equal Access to Services     [facility] : Hadii jose sheets hadasho Sobrandon, waaxda luqadaha, qaybta kaalmada adeegyada, giorgio salazar. So [facility] [phone].    ATENCIÓN: Si habla español, tiene a moreno disposición servicios gratuitos de asistencia lingüística. Llame al [phone].    We comply with applicable federal civil rights laws and [place] laws. We do not discriminate on the basis of race, color, national origin, age, disability sex, sexual orientation or gender identity.            Thank you!     [facility]  [facility] your [facility]. Our goal is always to provide you with excellent care. Hearing back from our patients is one way we can continue to improve our services. Please take a few minutes to complete the written survey that you may receive in the mail after your visit with us. Thank you!             Your Updated Medication List - Protect others around you: Learn how to safely use, store and throw away your medicines at www.disposemymeds.org.          This list is accurate as of: 8/4/17 10:18 AM.  Always use your most recent med list.                   Brand Name Dispense Instructions for use Diagnosis    aspirin 81 MG tablet     100 tablet    Take 1 tablet by mouth daily.        * blood  glucose monitoring test strip    no brand specified     Use 3 times a day to test blood sugars.        * blood glucose monitoring test strip    ONE TOUCH ULTRA    100 each    USE 1 STRIP TO TEST 4 TIMES DAILY    Type 2 diabetes mellitus with diabetic nephropathy, without long-term current use of insulin (H)       BLOOD GLUCOSE TEST STRIPS STRP     100 Strip    One Touch Ultra-test 3 times daily (on insulin)    Diabetes mellitus, type 2 (H)       enalapril 20 MG tablet    VASOTEC    90 tablet    Take 1 tablet (20 mg) by mouth daily    Hypertension goal BP (blood pressure) < 140/80       * insulin lispro 100 UNIT/ML injection    HumaLOG    3 Month    15 units before breakfast, 15 units before lunch, 15 units before dinner    Type 2 diabetes, HbA1c goal < 7% (H)       * HumaLOG KWIKpen 100 UNIT/ML injection   Generic drug:  insulin lispro     3 Month    INJECT 15 UNITS BEFORE BREAKFAST, 15 UNITS BEFORE LUNCH, 15 UNITS BEFORE DINNER    Type 2 diabetes mellitus with hyperglycemia (H)       * insulin pen needle 30G X 8 MM    NOVOFINE    400 each    1 Units 4 times daily        * insulin pen needle 30G X 8 MM    NOVOFINE    400 each    Use 4 daily or as directed.    Type 2 diabetes mellitus with hyperglycemia, with long-term current use of insulin (H)       LANTUS SOLOSTAR 100 UNIT/ML injection   Generic drug:  insulin glargine      80 units every 24 hours    Type 2 diabetes mellitus with hyperglycemia, with long-term current use of insulin (H)       metFORMIN 750 MG 24 hr tablet    GLUCOPHAGE-XR    180 tablet    Take 1 tablet (750 mg) by mouth daily (with breakfast)    Type 2 diabetes mellitus with hyperglycemia, with long-term current use of insulin (H)       Multi-vitamin Tabs tablet      Take 1 tablet by mouth daily        order for DME     300 each    Equipment being ordered: Lancets of choice for diabetic testing    Type 2 diabetes, HbA1C goal < 8% (H)       * order for DME     1 Units    Equipment being ordered:    New home glucometer of choice Test strips for new glucometer for three times daily monitoring--on insulin therapy  Anibal Vizcaino MD    Type 2 diabetes, HbA1C goal < 8% (H)       * order for DME     400 strip    Equipment being ordered: One Touch Ultra Glucometer Test Strips ( on insulin therapy )  To be used four times daily for glucose monitoring.    Diabetes mellitus, type 2 (H)       * order for DME     300 Units    Equipment being ordered: DELICA  Lancets--uses three time daily    Type 2 diabetes, HbA1C goal < 8% (H)       pravastatin 10 MG tablet    PRAVACHOL    90 tablet    Take 1 tablet (10 mg) by mouth daily    Hyperlipidemia LDL goal <100       * Notice:  This list has 9 medication(s) that are the same as other medications prescribed for you. Read the directions carefully, and ask your doctor or other care provider to review them with you.

## 2017-08-04 NOTE — NURSING NOTE
"Chief Complaint   Patient presents with     Diabetes     Panel Management     fall risk,        Initial /70  Pulse 78  Temp 98.2  F (36.8  C) (Temporal)  Resp 12  Ht 5' 8.5\" (1.74 m)  Wt 251 lb 1.6 oz (113.9 kg)  BMI 37.62 kg/m2 Estimated body mass index is 37.62 kg/(m^2) as calculated from the following:    Height as of this encounter: 5' 8.5\" (1.74 m).    Weight as of this encounter: 251 lb 1.6 oz (113.9 kg).  Medication Reconciliation: complete   April BIgnacio Meloon      "

## 2017-08-04 NOTE — PATIENT INSTRUCTIONS
These are new changes to your current plan of care based on today's visit:    Medications stopped    Medications to be started    Change dose of this medication Check to see the dose of Metformin you are taking--should be 750 mg once daily   New treatments        Follow up appointments:    1.  FOLLOW UP WITH YOUR PRIMARY CARE PROVIDER: 3 month(s) for diabetic follow up     2. FOLLOW UP WITH SPECIALIST: Hematologist possibly to be seen for follow up on the anemia    Anibal Vizcaino MD

## 2017-08-04 NOTE — LETTER
Alex Armenta  [address]  [place] [address]        August 4, 2017          Dear Candelario Johnson is a copy of the laboratory values from today. Results are as follows:    1. The protein in the urine is again improved from 8 months ago. We'll continue to monitor this. This is the albumin levels in the urine.  2. Thyroid status is normal. This is not contributing to the fatigue.  3. Your lipid profile is fine with no changes needed in medication  4. Your metabolic profile shows blood glucose of 68, somewhat low since you were fasting this morning. Your kidney function is stable from last time. Liver enzymes are normal. Do watch for hypoglycemic reactions.  5. The hemoglobin A1c remains stable at 6.3%.  6. The hemoglobin did drop back to 9.2, similar to what was last year and the year before. White blood cell count and platelet count remain normal. As we discussed, I do believe that the bone marrow is not functioning correctly and if you change your mind to return to see the hematologist for evaluation and possible treatment, I would be happy to arrange that.    No change in medications. Follow-up in 3 months or as needed. Please call with any questions or concerns and thank you for your confidence.        Sincerely,        Anibal Vizcaino MD

## 2017-09-11 DIAGNOSIS — E11.65 TYPE 2 DIABETES MELLITUS WITH HYPERGLYCEMIA, WITH LONG-TERM CURRENT USE OF INSULIN (H): Primary | ICD-10-CM

## 2017-09-11 DIAGNOSIS — Z79.4 TYPE 2 DIABETES MELLITUS WITH HYPERGLYCEMIA, WITH LONG-TERM CURRENT USE OF INSULIN (H): Primary | ICD-10-CM

## 2017-09-11 NOTE — TELEPHONE ENCOUNTER
HUMALOG KWIKPEN 100 UNIT/ML soln         Last Written Prescription Date: 8/17/16  Last Fill Quantity: 3 month, # refills: 3  Last Office Visit with G, P or [facility] prescribing provider:  8/4/17        BP Readings from Last 3 Encounters:   08/04/17 128/70   02/28/17 123/68   11/08/16 112/68     Lab Results   Component Value Date    MICROL 100 08/04/2017     Lab Results   Component Value Date    UMALCR 76.54 08/04/2017     Creatinine   Date Value Ref Range Status   08/04/2017 1.40 (H) 0.66 - 1.25 mg/dL Final   ]  GFR Estimate   Date Value Ref Range Status   08/04/2017 49 (L) >60 mL/min/1.7m2 Final     Comment:     Non  GFR Calc   02/28/2017 47 (L) >60 mL/min/1.7m2 Final     Comment:     Non  GFR Calc   11/08/2016 54 (L) >60 mL/min/1.7m2 Final     Comment:     Non  GFR Calc     GFR Estimate If Black   Date Value Ref Range Status   08/04/2017 59 (L) >60 mL/min/1.7m2 Final     Comment:      GFR Calc   02/28/2017 57 (L) >60 mL/min/1.7m2 Final     Comment:      GFR Calc   11/08/2016 65 >60 mL/min/1.7m2 Final     Comment:      GFR Calc     Lab Results   Component Value Date    CHOL 91 08/04/2017     Lab Results   Component Value Date    HDL 34 08/04/2017     Lab Results   Component Value Date    LDL 43 08/04/2017     Lab Results   Component Value Date    TRIG 72 08/04/2017     Lab Results   Component Value Date    CHOLHDLRATIO 3.0 08/11/2015     Lab Results   Component Value Date    AST 13 08/04/2017     Lab Results   Component Value Date    ALT 21 08/04/2017     Lab Results   Component Value Date    A1C 6.3 08/04/2017    A1C 6.4 02/28/2017    A1C 6.1 11/08/2016    A1C 7.0 07/07/2016    A1C 6.6 03/17/2016     Potassium   Date Value Ref Range Status   08/04/2017 4.9 3.4 - 5.3 mmol/L Final

## 2017-09-13 RX ORDER — INSULIN LISPRO 100 [IU]/ML
INJECTION, SOLUTION INTRAVENOUS; SUBCUTANEOUS
Qty: 15 ML | Refills: 2 | Status: SHIPPED | OUTPATIENT
Start: 2017-09-13 | End: 2018-02-12

## 2017-09-13 NOTE — TELEPHONE ENCOUNTER
HUMALOG KWIKPEN 100 UNIT/ML soln  Rx was sent 09/13/2017 for 15 mL and 2 refills. Confirmed with pharmacy this was received. Patient informed. Katina Stokes, RN, BSN

## 2017-09-13 NOTE — TELEPHONE ENCOUNTER
Patient is calling to check on the status of this refill, patient states he only has 2 days left of medication.    Thank you Elena

## 2017-11-01 DIAGNOSIS — E11.65 TYPE 2 DIABETES MELLITUS WITH HYPERGLYCEMIA, WITH LONG-TERM CURRENT USE OF INSULIN (H): ICD-10-CM

## 2017-11-01 DIAGNOSIS — Z79.4 TYPE 2 DIABETES MELLITUS WITH HYPERGLYCEMIA, WITH LONG-TERM CURRENT USE OF INSULIN (H): ICD-10-CM

## 2017-11-03 RX ORDER — METFORMIN HYDROCHLORIDE 750 MG/1
TABLET, EXTENDED RELEASE ORAL
Qty: 180 TABLET | Refills: 2 | Status: SHIPPED | OUTPATIENT
Start: 2017-11-03 | End: 2018-10-18

## 2017-11-03 NOTE — TELEPHONE ENCOUNTER
metFORMIN (GLUCOPHAGE-XR) 750 MG 24 hr tablet  Routing refill request to provider for review/approval because:  Labs out of range:  Creatinine  Please note dose/frequency change 02/2017      BP Readings from Last 3 Encounters:   08/04/17 128/70   02/28/17 123/68   11/08/16 112/68     Lab Results   Component Value Date    MICROL 100 08/04/2017     Lab Results   Component Value Date    UMALCR 76.54 08/04/2017     Creatinine   Date Value Ref Range Status   08/04/2017 1.40 (H) 0.66 - 1.25 mg/dL Final   ]  GFR Estimate   Date Value Ref Range Status   08/04/2017 49 (L) >60 mL/min/1.7m2 Final     Comment:     Non  GFR Calc   02/28/2017 47 (L) >60 mL/min/1.7m2 Final     Comment:     Non  GFR Calc   11/08/2016 54 (L) >60 mL/min/1.7m2 Final     Comment:     Non  GFR Calc     GFR Estimate If Black   Date Value Ref Range Status   08/04/2017 59 (L) >60 mL/min/1.7m2 Final     Comment:      GFR Calc   02/28/2017 57 (L) >60 mL/min/1.7m2 Final     Comment:      GFR Calc   11/08/2016 65 >60 mL/min/1.7m2 Final     Comment:      GFR Calc     Lab Results   Component Value Date    CHOL 91 08/04/2017     Lab Results   Component Value Date    HDL 34 08/04/2017     Lab Results   Component Value Date    LDL 43 08/04/2017     Lab Results   Component Value Date    TRIG 72 08/04/2017     Lab Results   Component Value Date    CHOLHDLRATIO 3.0 08/11/2015     Lab Results   Component Value Date    AST 13 08/04/2017     Lab Results   Component Value Date    ALT 21 08/04/2017     Lab Results   Component Value Date    A1C 6.3 08/04/2017    A1C 6.4 02/28/2017    A1C 6.1 11/08/2016    A1C 7.0 07/07/2016    A1C 6.6 03/17/2016     Potassium   Date Value Ref Range Status   08/04/2017 4.9 3.4 - 5.3 mmol/L Final     Katina Stokes, RN, BSN

## 2017-11-28 NOTE — PROGRESS NOTES
SUBJECTIVE:                                                    Alex Armenta is a 82 year old male who presents to clinic today for the following health issues:      HPI    Diabetes Follow-up    Patient is checking blood sugars: twice daily.    Results are as follows:       Before lunch 120 - 140  and before dinner  130 - 140         Diabetic concerns: None     Symptoms of hypoglycemia (low blood sugar): none     Paresthesias (numbness or burning in feet) or sores: No     Date of last diabetic eye exam:  April 2017     Hyperlipidemia Follow-Up      Rate your low fat/cholesterol diet?: not monitoring fat    Taking statin?  Yes, possible muscle aches from statin    Other lipid medications/supplements?:  none    Hypertension Follow-up      Outpatient blood pressures are not being checked.    Low Salt Diet: no added salt  Chronic Kidney Disease Follow-up      Current NSAID use?  Yes:  Aspirin 81 Mg             Problem list and histories reviewed & adjusted, as indicated.  Additional history: as documented            ROS:  C: NEGATIVE for fever, chills, change in weight  CONSTITUTIONAL: Some exertional fatigue but no chest pain or respiratory changes. Sleeping reasonably well.  I: NEGATIVE for worrisome rashes, moles or lesions  E: NEGATIVE for vision changes or irritation  E/M: NEGATIVE for ear, mouth and throat problems  R: NEGATIVE for significant cough or SOB  B: NEGATIVE for masses, tenderness or discharge  CV: NEGATIVE for chest pain, palpitations or peripheral edema  GI: NEGATIVE for nausea, abdominal pain, heartburn, or change in bowel habits  : NEGATIVE for frequency, dysuria, or hematuria   male : Reports reasonable urinary flow and emptying of his bladder. Has stable chronic kidney disease stage III with microalbuminuria.  MUSCULOSKELETAL: There is minor to moderate arthritic symptoms in multiple joints, not limiting activity or sleep.  N: NEGATIVE for weakness, dizziness or paresthesias  E: NEGATIVE for  "temperature intolerance, skin/hair changes  ENDOCRINE:  As insulin-dependent diabetes without major hypoglycemic reactions.  H: NEGATIVE for bleeding problems  HEME/ALLERGY/IMMUNE: As suspected myelodysplastic syndrome which is been discussed at each visit. To date, the patient does not wish to be seen in consultation hematology. Has remained fairly stable over time.  P: NEGATIVE for changes in mood or affect    OBJECTIVE:                                                    /56  Pulse 72  Temp 96.2  F (35.7  C) (Temporal)  Resp 14  Ht 5' 8.5\" (1.74 m)  Wt 250 lb 14.4 oz (113.8 kg)  SpO2 96%  BMI 37.59 kg/m2  Body mass index is 37.59 kg/(m^2).  GENERAL: alert, active, no distress, cooperative and over weight  EYES: Eyes grossly normal to inspection, extraocular movements - intact, and PERRL  HENT: ear canals- normal; TMs- normal; Nose- normal; Mouth- no ulcers, no lesions  NECK: no tenderness, no adenopathy, no asymmetry, no masses, no stiffness; thyroid- normal to palpation  NECK: No carotid bruits noted  RESP: lungs clear to auscultation - no rales, no rhonchi, no wheezes  CV: regular rates and rhythm, normal S1 S2, no S3 or S4, no irregular beats, no bruits heard and grade 3/6 systolic murmur heard best over the apex and left sternal border to the base of the heart. Last echocardiogram 5 years ago to reveal any major valvular defects with only mild aortic calcification noted.  ABDOMEN: soft, no tenderness, no  hepatosplenomegaly, no masses, normal bowel sounds  MS: extremities- no gross deformities noted, no edema  MS: Trace pretibial edema otherwise no gross abnormalities other than some degenerative joint changes.  SKIN: no suspicious lesions, no rashes  NEURO: strength and tone- normal, sensory exam- grossly normal, mentation- intact, speech- normal, reflexes- symmetric  BACK: no CVA tenderness, no paralumbar tenderness  PSYCH: Alert and oriented times 3; speech- coherent , normal rate and volume; " able to articulate logical thoughts, able to abstract reason, no tangential thoughts, no hallucinations or delusions, affect- normal  LYMPHATICS: ant. cervical- normal, post. cervical- normal, axillary- normal, supraclavicular- normal, inguinal- normal    Diagnostic test results:  Diagnostic Test Results:  Results for orders placed or performed in visit on 12/01/17 (from the past 24 hour(s))   CBC with platelets   Result Value Ref Range    WBC 11.1 (H) 4.0 - 11.0 10e9/L    RBC Count 2.98 (L) 4.4 - 5.9 10e12/L    Hemoglobin 10.5 (L) 13.3 - 17.7 g/dL    Hematocrit 31.9 (L) 40.0 - 53.0 %     (H) 78 - 100 fl    MCH 35.2 (H) 26.5 - 33.0 pg    MCHC 32.9 31.5 - 36.5 g/dL    RDW 18.4 (H) 10.0 - 15.0 %    Platelet Count 219 150 - 450 10e9/L   Lipid panel reflex to direct LDL Fasting   Result Value Ref Range    Cholesterol 96 <200 mg/dL    Triglycerides 108 <150 mg/dL    HDL Cholesterol 39 (L) >39 mg/dL    LDL Cholesterol Calculated 35 <100 mg/dL    Non HDL Cholesterol 57 <130 mg/dL   Comprehensive metabolic panel   Result Value Ref Range    Sodium 141 133 - 144 mmol/L    Potassium 4.6 3.4 - 5.3 mmol/L    Chloride 107 94 - 109 mmol/L    Carbon Dioxide 26 20 - 32 mmol/L    Anion Gap 8 3 - 14 mmol/L    Glucose 128 (H) 70 - 99 mg/dL    Urea Nitrogen 30 7 - 30 mg/dL    Creatinine 1.40 (H) 0.66 - 1.25 mg/dL    GFR Estimate 49 (L) >60 mL/min/1.7m2    GFR Estimate If Black 59 (L) >60 mL/min/1.7m2    Calcium 9.0 8.5 - 10.1 mg/dL    Bilirubin Total 1.0 0.2 - 1.3 mg/dL    Albumin 4.1 3.4 - 5.0 g/dL    Protein Total 7.4 6.8 - 8.8 g/dL    Alkaline Phosphatase 66 40 - 150 U/L    ALT 23 0 - 70 U/L    AST 15 0 - 45 U/L   Hemoglobin A1c   Result Value Ref Range    Hemoglobin A1C 6.2 (H) 4.3 - 6.0 %          ASSESSMENT/PLAN:                                                    1. Type 2 diabetes mellitus with hyperglycemia, with long-term current use of insulin (H)   A1c remains under 7%. No change in medications. May be slightly lower  than actual diabetic control due to his anemia. Given his age of 82, no major changes need be done. A1c less than 7.5% would be acceptable. Follow-up in 3 months.  - CBC with platelets  - Lipid panel reflex to direct LDL Fasting  - Comprehensive metabolic panel  - Hemoglobin A1c  - Albumin Random Urine Quantitative with Creat Ratio    2. CKD (chronic kidney disease) stage 3, GFR 30-59 ml/min  Relatively stable with GFR at 49  - CBC with platelets  - Lipid panel reflex to direct LDL Fasting  - Comprehensive metabolic panel    3. MDS (myelodysplastic syndrome) (H)  Some improvement in overall anemia. Maintaining a normal platelet count and slight elevation of white blood cells.  - CBC with platelets  - Protein Immunofixation Serum    4. Hypertension goal BP (blood pressure) < 140/80  Blood pressure well maintained  - Comprehensive metabolic panel  - Albumin Random Urine Quantitative with Creat Ratio    5. Morbid obesity (H)  Long-term weight loss would be recommended    6. Hyperlipidemia LDL goal <100  Lipids are acceptable  - Lipid panel reflex to direct LDL Fasting  - Comprehensive metabolic panel    7. Microalbuminuria  Results pending  - Albumin Random Urine Quantitative with Creat Ratio    8. Need for prophylactic vaccination and inoculation against influenza  Vaccination given today.  - FLU VACCINE, INCREASED ANTIGEN, PRESV FREE, AGE 65+ [24287]  - Vaccine Administration, Initial [64369]      Follow up with Provider - Follow-up in 3 months or as needed. No change in medications.  See Patient Instructions    The patient understood the rational for the diagnosis and treatment plan. All questions were answered to best of my ability and the patient's satisfaction.    Note: Chart documentation done in part with Dragon Voice Recognition software. Although reviewed after completion, some word and grammatical errors may remain.  Please consider this when interpreting information in this chart.      Anibal Vizcaino,  MD  [facility]

## 2017-12-01 ENCOUNTER — OFFICE VISIT (OUTPATIENT)
Dept: FAMILY MEDICINE | Facility: CLINIC | Age: 82
End: 2017-12-01
Payer: COMMERCIAL

## 2017-12-01 VITALS
TEMPERATURE: 96.2 F | WEIGHT: 250.9 LBS | DIASTOLIC BLOOD PRESSURE: 56 MMHG | RESPIRATION RATE: 14 BRPM | OXYGEN SATURATION: 96 % | SYSTOLIC BLOOD PRESSURE: 122 MMHG | BODY MASS INDEX: 37.16 KG/M2 | HEIGHT: 69 IN | HEART RATE: 72 BPM

## 2017-12-01 DIAGNOSIS — R80.9 MICROALBUMINURIA: ICD-10-CM

## 2017-12-01 DIAGNOSIS — D46.9 MDS (MYELODYSPLASTIC SYNDROME) (H): ICD-10-CM

## 2017-12-01 DIAGNOSIS — E66.01 MORBID OBESITY (H): ICD-10-CM

## 2017-12-01 DIAGNOSIS — Z23 NEED FOR PROPHYLACTIC VACCINATION AND INOCULATION AGAINST INFLUENZA: ICD-10-CM

## 2017-12-01 DIAGNOSIS — E11.65 TYPE 2 DIABETES MELLITUS WITH HYPERGLYCEMIA, WITH LONG-TERM CURRENT USE OF INSULIN (H): Primary | ICD-10-CM

## 2017-12-01 DIAGNOSIS — N18.30 CKD (CHRONIC KIDNEY DISEASE) STAGE 3, GFR 30-59 ML/MIN (H): ICD-10-CM

## 2017-12-01 DIAGNOSIS — I10 HYPERTENSION GOAL BP (BLOOD PRESSURE) < 140/80: ICD-10-CM

## 2017-12-01 DIAGNOSIS — Z79.4 TYPE 2 DIABETES MELLITUS WITH HYPERGLYCEMIA, WITH LONG-TERM CURRENT USE OF INSULIN (H): Primary | ICD-10-CM

## 2017-12-01 DIAGNOSIS — E78.5 HYPERLIPIDEMIA LDL GOAL <100: ICD-10-CM

## 2017-12-01 LAB
ALBUMIN SERPL-MCNC: 4.1 G/DL (ref 3.4–5)
ALP SERPL-CCNC: 66 U/L (ref 40–150)
ALT SERPL W P-5'-P-CCNC: 23 U/L (ref 0–70)
ANION GAP SERPL CALCULATED.3IONS-SCNC: 8 MMOL/L (ref 3–14)
AST SERPL W P-5'-P-CCNC: 15 U/L (ref 0–45)
BILIRUB SERPL-MCNC: 1 MG/DL (ref 0.2–1.3)
BUN SERPL-MCNC: 30 MG/DL (ref 7–30)
CALCIUM SERPL-MCNC: 9 MG/DL (ref 8.5–10.1)
CHLORIDE SERPL-SCNC: 107 MMOL/L (ref 94–109)
CHOLEST SERPL-MCNC: 96 MG/DL
CO2 SERPL-SCNC: 26 MMOL/L (ref 20–32)
CREAT SERPL-MCNC: 1.4 MG/DL (ref 0.66–1.25)
CREAT UR-MCNC: 170 MG/DL
ERYTHROCYTE [DISTWIDTH] IN BLOOD BY AUTOMATED COUNT: 18.4 % (ref 10–15)
GFR SERPL CREATININE-BSD FRML MDRD: 49 ML/MIN/1.7M2
GLUCOSE SERPL-MCNC: 128 MG/DL (ref 70–99)
HBA1C MFR BLD: 6.2 % (ref 4.3–6)
HCT VFR BLD AUTO: 31.9 % (ref 40–53)
HDLC SERPL-MCNC: 39 MG/DL
HGB BLD-MCNC: 10.5 G/DL (ref 13.3–17.7)
LDLC SERPL CALC-MCNC: 35 MG/DL
MCH RBC QN AUTO: 35.2 PG (ref 26.5–33)
MCHC RBC AUTO-ENTMCNC: 32.9 G/DL (ref 31.5–36.5)
MCV RBC AUTO: 107 FL (ref 78–100)
MICROALBUMIN UR-MCNC: 128 MG/L
MICROALBUMIN/CREAT UR: 75.29 MG/G CR (ref 0–17)
NONHDLC SERPL-MCNC: 57 MG/DL
PLATELET # BLD AUTO: 219 10E9/L (ref 150–450)
POTASSIUM SERPL-SCNC: 4.6 MMOL/L (ref 3.4–5.3)
PROT SERPL-MCNC: 7.4 G/DL (ref 6.8–8.8)
RBC # BLD AUTO: 2.98 10E12/L (ref 4.4–5.9)
SODIUM SERPL-SCNC: 141 MMOL/L (ref 133–144)
TRIGL SERPL-MCNC: 108 MG/DL
WBC # BLD AUTO: 11.1 10E9/L (ref 4–11)

## 2017-12-01 PROCEDURE — 99214 OFFICE O/P EST MOD 30 MIN: CPT | Mod: 25 | Performed by: FAMILY MEDICINE

## 2017-12-01 PROCEDURE — 82043 UR ALBUMIN QUANTITATIVE: CPT | Performed by: FAMILY MEDICINE

## 2017-12-01 PROCEDURE — 36415 COLL VENOUS BLD VENIPUNCTURE: CPT | Performed by: FAMILY MEDICINE

## 2017-12-01 PROCEDURE — 90662 IIV NO PRSV INCREASED AG IM: CPT | Performed by: FAMILY MEDICINE

## 2017-12-01 PROCEDURE — G0008 ADMIN INFLUENZA VIRUS VAC: HCPCS | Performed by: FAMILY MEDICINE

## 2017-12-01 PROCEDURE — 80053 COMPREHEN METABOLIC PANEL: CPT | Performed by: FAMILY MEDICINE

## 2017-12-01 PROCEDURE — 80061 LIPID PANEL: CPT | Performed by: FAMILY MEDICINE

## 2017-12-01 PROCEDURE — 83036 HEMOGLOBIN GLYCOSYLATED A1C: CPT | Performed by: FAMILY MEDICINE

## 2017-12-01 PROCEDURE — 82784 ASSAY IGA/IGD/IGG/IGM EACH: CPT | Performed by: FAMILY MEDICINE

## 2017-12-01 PROCEDURE — 86334 IMMUNOFIX E-PHORESIS SERUM: CPT | Performed by: FAMILY MEDICINE

## 2017-12-01 PROCEDURE — 85027 COMPLETE CBC AUTOMATED: CPT | Performed by: FAMILY MEDICINE

## 2017-12-01 ASSESSMENT — PAIN SCALES - GENERAL: PAINLEVEL: SEVERE PAIN (6)

## 2017-12-01 NOTE — NURSING NOTE
"Chief Complaint   Patient presents with     Diabetes     Panel Management     flu shot, honoring choice, fall risk, eye exam, foot exam        Initial /56  Pulse 72  Temp 96.2  F (35.7  C) (Temporal)  Resp 14  Ht 5' 8.5\" (1.74 m)  Wt 250 lb 14.4 oz (113.8 kg)  SpO2 96%  BMI 37.59 kg/m2 Estimated body mass index is 37.59 kg/(m^2) as calculated from the following:    Height as of this encounter: 5' 8.5\" (1.74 m).    Weight as of this encounter: 250 lb 14.4 oz (113.8 kg).  Medication Reconciliation: complete     Enma Hampton MA       "

## 2017-12-01 NOTE — LETTER
December 4, 2017      Alex Perearr  [address]  [place] [address]        Dear Aaron,    The laboratory values from last week are included with this letter and results are as follows:    1. The protein in the urine remained stable no changes are needed in medication  2. The screening for multiple myeloma (a malignant blood disease) was normal. Protein immunofixation fixation test checks for this.  3. The metabolic panel was stable with normal liver enzymes. Blood glucose was 128. Kidney function remains stable, mildly compromised.  4. Lipid profile was excellent  5. The hemoglobin (red blood cell count) actually went up significantly with a 10.5. White blood cell count is still fine. No major changes otherwise noted in the CBC.  6. Hemoglobin A1c remains at 6.2% with good control of your diabetes.    Follow up in three months as we planned. Please call with any questions or concerns and thank you for your confidence.          Sincerely,        Anibal Vizcaino MD

## 2017-12-01 NOTE — PATIENT INSTRUCTIONS
These are new changes to your current plan of care based on today's visit:    Medications stopped    Medications to be started    Change dose of this medication None   New treatments        Follow up appointments:    1.  FOLLOW UP WITH YOUR PRIMARY CARE PROVIDER: 3 month(s) for diabetic follow up    Anibal Vizcaino MD

## 2017-12-01 NOTE — PROGRESS NOTES
Injectable Influenza Immunization Documentation    1.  Is the person to be vaccinated sick today?   No    2. Does the person to be vaccinated have an allergy to a component   of the vaccine?   No  Egg Allergy Algorithm Link    3. Has the person to be vaccinated ever had a serious reaction   to influenza vaccine in the past?   No    4. Has the person to be vaccinated ever had Guillain-Barré syndrome?   No    Form completed by Enma Hampton MA       Prior to injection verified patient identity using patient's name and date of birth.      Per orders of Dr. CONWAY, injection of Flu  given by Enma Bajwa. Patient instructed to remain in clinic for 15 minutes afterwards, and to report any adverse reaction to me immediately.

## 2017-12-01 NOTE — MR AVS SNAPSHOT
After Visit Summary   12/1/2017    Alex Armenta    MRN: [id number]           Patient Information     Date Of Birth          1935        Visit Information        Provider Department      12/1/2017 9:20 AM Anibal Vizcaino MD [facility]        Today's Diagnoses     Type 2 diabetes mellitus with hyperglycemia, with long-term current use of insulin (H)    -  1    CKD (chronic kidney disease) stage 3, GFR 30-59 ml/min        MDS (myelodysplastic syndrome) (H)        Hypertension goal BP (blood pressure) < 140/80        Morbid obesity (H)        Hyperlipidemia LDL goal <100        Microalbuminuria          Care Instructions    These are new changes to your current plan of care based on today's visit:    Medications stopped    Medications to be started    Change dose of this medication None   New treatments        Follow up appointments:    1.  FOLLOW UP WITH YOUR PRIMARY CARE PROVIDER: 3 month(s) for diabetic follow up    Anibal Vizcaino MD                Follow-ups after your visit        Follow-up notes from your care team     Return in about 3 months (around 3/1/2018) for Routine Visit, Lab Work.      Who to contact     If you have questions or need follow up information about today's clinic visit or your schedule please contact [facility] directly at [phone].  Normal or non-critical lab and imaging results will be communicated to you by MyChart, letter or phone within 4 business days after the clinic has received the results. If you do not hear from us within 7 days, please contact the clinic through MyChart or phone. If you have a critical or abnormal lab result, we will notify you by phone as soon as possible.  Submit refill requests through [employer] or call your pharmacy and they will forward the refill request to us. Please allow 3 business days for your refill to be completed.          Additional Information About Your Visit        MyChart Information      "[facility] lets you send messages to your doctor, view your test results, renew your prescriptions, schedule appointments and more. To sign up, go to [email or web address] . Click on \"Log in\" on the left side of the screen, which will take you to the Welcome page. Then click on \"Sign up Now\" on the right side of the page.     You will be asked to enter the access code listed below, as well as some personal information. Please follow the directions to create your username and password.     Your access code is: [email or web address]  Expires: 3/1/2018  9:51 AM     Your access code will  in 90 days. If you need help or a new code, please call your [place] clinic or [phone].        Care EveryWhere ID     This is your Care EveryWhere ID. This could be used by other organizations to access your [place] medical records  [id number]        Your Vitals Were     Pulse Temperature Respirations Height Pulse Oximetry BMI (Body Mass Index)    72 96.2  F (35.7  C) (Temporal) 14 5' 8.5\" (1.74 m) 96% 37.59 kg/m2       Blood Pressure from Last 3 Encounters:   17 122/56   17 128/70   17 123/68    Weight from Last 3 Encounters:   17 250 lb 14.4 oz (113.8 kg)   17 251 lb 1.6 oz (113.9 kg)   17 253 lb 11.2 oz (115.1 kg)              We Performed the Following     Albumin Random Urine Quantitative with Creat Ratio     CBC with platelets     Comprehensive metabolic panel     Hemoglobin A1c     Lipid panel reflex to direct LDL Fasting     Protein Immunofixation Serum        Primary Care Provider Office Phone # Fax #    Anibal Vizcaino [phone] [address]  [place] [address]        Equal Access to Services     MANE SYED : Miladys Khanna, sole silva, giorgio tariq. [facility] [phone].    ATENCIÓN: Si habla español, tiene a moreno disposición servicios gratuitos de asistencia lingüística. " Romaine al [phone].    We comply with applicable federal civil rights laws and [place] laws. We do not discriminate on the basis of race, color, national origin, age, disability, sex, sexual orientation, or gender identity.            Thank you!     Thank you for choosing [facility]  for your care. Our goal is always to provide you with excellent care. Hearing back from our patients is one way we can continue to improve our services. Please take a few minutes to complete the written survey that you may receive in the mail after your visit with us. Thank you!             Your Updated Medication List - Protect others around you: Learn how to safely use, store and throw away your medicines at www.disposemymeds.org.          This list is accurate as of: 12/1/17  9:51 AM.  Always use your most recent med list.                   Brand Name Dispense Instructions for use Diagnosis    aspirin 81 MG tablet     100 tablet    Take 1 tablet by mouth daily.        * blood glucose monitoring test strip    no brand specified     Use 3 times a day to test blood sugars.        * blood glucose monitoring test strip    ONETOUCH ULTRA    100 each    USE 1 STRIP TO TEST 4 TIMES DAILY    Type 2 diabetes mellitus with diabetic nephropathy, without long-term current use of insulin (H)       BLOOD GLUCOSE TEST STRIPS STRP     100 Strip    One Touch Ultra-test 3 times daily (on insulin)    Diabetes mellitus, type 2 (H)       enalapril 20 MG tablet    VASOTEC    90 tablet    Take 1 tablet (20 mg) by mouth daily    Hypertension goal BP (blood pressure) < 140/80       * insulin lispro 100 UNIT/ML injection    HumaLOG    3 Month    15 units before breakfast, 15 units before lunch, 15 units before dinner    Type 2 diabetes, HbA1c goal < 7% (H)       * HumaLOG KWIKpen 100 UNIT/ML injection   Generic drug:  insulin lispro     15 mL    INJECT 15 UNITS BEFORE BREAKFAST,15 UNITS BEFORE LUNCH,15 UNITS BEFORE DINNER    Type 2 diabetes  mellitus with hyperglycemia, with long-term current use of insulin (H)       * insulin pen needle 30G X 8 MM    NOVOFINE    400 each    1 Units 4 times daily        * insulin pen needle 30G X 8 MM    NOVOFINE    400 each    Use 4 daily or as directed.    Type 2 diabetes mellitus with hyperglycemia, with long-term current use of insulin (H)       LANTUS SOLOSTAR 100 UNIT/ML injection   Generic drug:  insulin glargine      80 units every 24 hours    Type 2 diabetes mellitus with hyperglycemia, with long-term current use of insulin (H), MDS (myelodysplastic syndrome) (H), CKD (chronic kidney disease) stage 3, GFR 30-59 ml/min, Hypertension goal BP (blood pressure) < 140/80, Hyperlipidemia LDL goal <100, Other fatigue       metFORMIN 750 MG 24 hr tablet    GLUCOPHAGE-XR    180 tablet    TAKE 1 TABLET (750 MG) BY MOUTH 2 TIMES DAILY (WITH MEALS)    Type 2 diabetes mellitus with hyperglycemia, with long-term current use of insulin (H)       Multi-vitamin Tabs tablet      Take 1 tablet by mouth daily        order for DME     300 each    Equipment being ordered: Lancets of choice for diabetic testing    Type 2 diabetes, HbA1C goal < 8% (H)       * order for DME     1 Units    Equipment being ordered:   New home glucometer of choice Test strips for new glucometer for three times daily monitoring--on insulin therapy  Anibal Vizcaino MD    Type 2 diabetes, HbA1C goal < 8% (H)       * order for DME     400 strip    Equipment being ordered: One Touch Ultra Glucometer Test Strips ( on insulin therapy )  To be used four times daily for glucose monitoring.    Diabetes mellitus, type 2 (H)       * order for DME     300 Units    Equipment being ordered: DELICA  Lancets--uses three time daily    Type 2 diabetes, HbA1C goal < 8% (H)       pravastatin 10 MG tablet    PRAVACHOL    90 tablet    Take 1 tablet (10 mg) by mouth daily    Hyperlipidemia LDL goal <100       * Notice:  This list has 9 medication(s) that are the same as other  medications prescribed for you. Read the directions carefully, and ask your doctor or other care provider to review them with you.

## 2017-12-04 LAB
IGA SERPL-MCNC: 205 MG/DL (ref 70–380)
IGG SERPL-MCNC: 930 MG/DL (ref 695–1620)
IGM SERPL-MCNC: 56 MG/DL (ref 60–265)
PROT PATTERN SERPL IFE-IMP: ABNORMAL

## 2018-01-17 DIAGNOSIS — Z79.4 TYPE 2 DIABETES MELLITUS WITH HYPERGLYCEMIA, WITH LONG-TERM CURRENT USE OF INSULIN (H): ICD-10-CM

## 2018-01-17 DIAGNOSIS — D46.9 MDS (MYELODYSPLASTIC SYNDROME) (H): ICD-10-CM

## 2018-01-17 DIAGNOSIS — E11.65 TYPE 2 DIABETES MELLITUS WITH HYPERGLYCEMIA, WITH LONG-TERM CURRENT USE OF INSULIN (H): ICD-10-CM

## 2018-01-17 DIAGNOSIS — N18.30 CKD (CHRONIC KIDNEY DISEASE) STAGE 3, GFR 30-59 ML/MIN (H): ICD-10-CM

## 2018-01-17 DIAGNOSIS — R53.83 OTHER FATIGUE: ICD-10-CM

## 2018-01-17 DIAGNOSIS — I10 HYPERTENSION GOAL BP (BLOOD PRESSURE) < 140/80: ICD-10-CM

## 2018-01-17 DIAGNOSIS — E78.5 HYPERLIPIDEMIA LDL GOAL <100: ICD-10-CM

## 2018-01-18 RX ORDER — INSULIN GLARGINE 100 [IU]/ML
INJECTION, SOLUTION SUBCUTANEOUS
Qty: 15 ML | Refills: 2 | Status: SHIPPED | OUTPATIENT
Start: 2018-01-18 | End: 2018-02-13

## 2018-02-12 DIAGNOSIS — E11.65 TYPE 2 DIABETES MELLITUS WITH HYPERGLYCEMIA, WITH LONG-TERM CURRENT USE OF INSULIN (H): ICD-10-CM

## 2018-02-12 DIAGNOSIS — Z79.4 TYPE 2 DIABETES MELLITUS WITH HYPERGLYCEMIA, WITH LONG-TERM CURRENT USE OF INSULIN (H): ICD-10-CM

## 2018-02-13 RX ORDER — INSULIN LISPRO 100 [IU]/ML
INJECTION, SOLUTION INTRAVENOUS; SUBCUTANEOUS
Qty: 15 ML | Refills: 1 | Status: SHIPPED | OUTPATIENT
Start: 2018-02-13 | End: 2018-05-15

## 2018-02-13 NOTE — TELEPHONE ENCOUNTER
"Requested Prescriptions   Pending Prescriptions Disp Refills     HUMALOG KWIKPEN 100 UNIT/ML soln [Pharmacy Med Name: HumaLOG KwikPen Subcutaneous Solution Pen-injector 100 UNIT/ML] 15 mL 1     Sig: INJECT 15 UNITS BEFORE BREAKFAST, 15 UNITS BEFORE LUNCH, 15 UNITS BEFORE DINNER    Short Acting Insulin Protocol Passed    2/12/2018  6:42 PM       Passed - Blood pressure less than 140/90 in past 6 months    BP Readings from Last 3 Encounters:   12/01/17 122/56   08/04/17 128/70   02/28/17 123/68                Passed - LDL on file in past 12 months    Recent Labs   Lab Test  12/01/17   0954   LDL  35            Passed - Microalbumin on file in past 12 months    Recent Labs   Lab Test  12/01/17   1003   MICROL  128   UMALCR  75.29*            Passed - Serum creatinine on file in past 12 months    Recent Labs   Lab Test  12/01/17   0954   CR  1.40*            Passed - HgbA1C in past 3 or 6 months    Recent Labs   Lab Test  12/01/17   0954   A1C  6.2*            Passed - Patient is age 18 or older       Passed - Recent visit with authorizing provider's specialty in past 6 months    Patient had office visit in the last 6 months or has a visit in the next 30 days with authorizing provider.  See \"Patient Info\" tab in inbasket, or \"Choose Columns\" in Meds & Orders section of the refill encounter.            Prescription approved per [facility] Refill Protocol.    Liliam Arteaga RN    "

## 2018-02-13 NOTE — TELEPHONE ENCOUNTER
received fax from pharmacy. Note:    Patient wants this written for 5 boxes instead of 1 box.  Please send Rx to [facility] in [place].

## 2018-04-02 DIAGNOSIS — I10 HYPERTENSION GOAL BP (BLOOD PRESSURE) < 140/80: ICD-10-CM

## 2018-04-03 RX ORDER — ENALAPRIL MALEATE 20 MG/1
TABLET ORAL
Qty: 90 TABLET | Refills: 2 | Status: SHIPPED | OUTPATIENT
Start: 2018-04-03 | End: 2019-01-07

## 2018-04-03 NOTE — TELEPHONE ENCOUNTER
enalapril (VASOTEC) 20 MG tablet  Creatinine   Date Value Ref Range Status   12/01/2017 1.40 (H) 0.66 - 1.25 mg/dL Final   ]  Routing refill request to provider for review/approval because:  Labs out of range:  Creatinine  Patient needs to be seen because:  Due for 3 month follow up     Please assist with scheduling.    Katina Stokes, RN, BSN

## 2018-04-09 NOTE — PROGRESS NOTES
SUBJECTIVE:   Alex Armenta is a 83 year old male who presents to clinic today for the following health issues:      History of Present Illness     Diabetes:     Frequency of checking blood sugars::  2 times a day    Diabetic concerns::  None    Hypoglycemia symptoms::  Weak and Blurred vision    Paraesthesia present::  YES    Eye Exam in the last year::  Yes    next thursday    Diet:  Diabetic  Taking medications regularly:  Yes  Medication side effects:  Muscle aches        Hyperlipidemia Follow-Up      Rate your low fat/cholesterol diet?: not monitoring fat    Taking statin?  Yes, muscle aches, possibly from other cause    Other lipid medications/supplements?:  none    Hypertension Follow-up      Outpatient blood pressures are not being checked.    Low Salt Diet: not monitoring salt    Problem list and histories reviewed & adjusted, as indicated.  Additional history: as documented        Current Outpatient Prescriptions   Medication Sig Dispense Refill     insulin glargine (LANTUS SOLOSTAR) 100 UNIT/ML injection INJECT 50 UNITS EVERY 24 HOURS 15 mL 2     enalapril (VASOTEC) 20 MG tablet TAKE 1 TABLET BY MOUTH ONCE DAILY 90 tablet 2     HUMALOG KWIKPEN 100 UNIT/ML soln INJECT 15 UNITS BEFORE BREAKFAST, 15 UNITS BEFORE LUNCH, 15 UNITS BEFORE DINNER 15 mL 1     metFORMIN (GLUCOPHAGE-XR) 750 MG 24 hr tablet TAKE 1 TABLET (750 MG) BY MOUTH 2 TIMES DAILY (WITH MEALS) 180 tablet 2     blood glucose monitoring (ONE TOUCH ULTRA) test strip USE 1 STRIP TO TEST 4 TIMES DAILY 100 each 3     insulin pen needle (NOVOFINE) 30G X 8 MM 1 Units 4 times daily 400 each 3     insulin pen needle (NOVOFINE) 30G X 8 MM Use 4 daily or as directed. 400 each 3     pravastatin (PRAVACHOL) 10 MG tablet Take 1 tablet (10 mg) by mouth daily 90 tablet 2     blood glucose monitoring (NO BRAND SPECIFIED) test strip Use 3 times a day to test blood sugars.        multivitamin, therapeutic with minerals (MULTI-VITAMIN) TABS Take 1 tablet by mouth  daily       ORDER FOR DME Equipment being ordered: DELICA  Lancets--uses three time daily 300 Units 4     ORDER FOR DME Equipment being ordered: One Touch Ultra Glucometer Test Strips ( on insulin therapy )    To be used four times daily for glucose monitoring. 400 strip 3     ORDER FOR DME Equipment being ordered:     New home glucometer of choice  Test strips for new glucometer for three times daily monitoring--on insulin therapy    Anibal Vizcaino MD 1 Units 1     ORDER FOR DME Equipment being ordered: Lancets of choice for diabetic testing 300 each 5     aspirin 81 MG tablet Take 1 tablet by mouth daily. 100 tablet 3     BLOOD GLUCOSE TEST STRIPS STRP One Touch Ultra-test 3 times daily (on insulin) 100 Strip prn     [DISCONTINUED] insulin glargine (LANTUS SOLOSTAR) 100 UNIT/ML injection INJECT 90 UNITS EVERY 24 HOURS 15 mL 2     [DISCONTINUED] insulin lispro (HUMALOG) 100 UNIT/ML VIAL 15 units before breakfast, 15 units before lunch, 15 units before dinner 3 Month 3       ROS:  CONSTITUTIONAL: NEGATIVE for fever, chills, change in weight  CONSTITUTIONAL: Continues to be morbidly obese.  INTEGUMENTARY/SKIN: NEGATIVE for worrisome rashes, moles or lesions  EYES: NEGATIVE for vision changes or irritation  ENT/MOUTH: NEGATIVE for ear, mouth and throat problems  ENT/MOUTH: Significantly hard of hearing.  Has simple amplification hearing aids at home which he uses but are not sophisticated enough to use in public or with driving.  RESP: Reports some shortness of breath with going upstairs and with exertion.  Denies any chest pain.  Obesity may play a significant role as well as deconditioning.  He is concerned about pulmonary fibrosis and his brothers  of.  BREAST: NEGATIVE for masses, tenderness or discharge  CV: NEGATIVE for chest pain, palpitations or peripheral edema  CV: No chest pain with exertion.  Does have an aortic valve calcifications with likely a soft systolic murmur from the aortic valve.  A stat  "echo was in 2012.  GI: NEGATIVE for nausea, abdominal pain, heartburn, or change in bowel habits  : NEGATIVE for frequency, dysuria, or hematuria   male : Occasionally has some urge incontinence of urine and urgency to go.  Try nonpharmacologic means initially with [place] exercises.  Would likely have difficulty with anticholinergic medications.  MUSCULOSKELETAL: No major edema or changes.  NEURO: NEGATIVE for weakness, dizziness or paresthesias  ENDOCRINE: Has insulin-dependent diabetes.  Recently had been having difficulty with early morning hypoglycemia with blood glucose levels in the 70 range.  He self adjusted the Lantus insulin to 50 units and has not had ongoing problems.  HEME: NEGATIVE for bleeding problems  HEME/ALLERGY/IMMUNE: Have discussed over time the findings of macrocytic anemia and the likelihood of myelodysplastic syndrome.  Monitors every 3 months.  Have offered hematology consultation with likelihood of a bone marrow being done for definitive diagnosis.  Patient feels that he does not wish to pursue that at this time.  PSYCHIATRIC: NEGATIVE for changes in mood or affect    OBJECTIVE:                                                    /50  Pulse 68  Temp 97.8  F (36.6  C) (Temporal)  Resp 16  Ht 5' 8.5\" (1.74 m)  Wt 246 lb 14.4 oz (112 kg)  SpO2 98%  BMI 36.99 kg/m2  Body mass index is 36.99 kg/(m^2).  GENERAL: alert, active, no distress, cooperative, pale and over weight  EYES: Eyes grossly normal to inspection, extraocular movements - intact, and PERRL  HENT: ear canals- normal; TMs- normal; Nose- normal; Mouth- no ulcers, no lesions  NECK: no tenderness, no adenopathy, no asymmetry, no masses, no stiffness; thyroid- normal to palpation  RESP: lungs clear to auscultation - no rales, no rhonchi, no wheezes  BREAST: no masses, no tenderness, no nipple discharge, no palpable axillary masses or adenopathy  CV: regular rates and rhythm, normal S1 S2, no S3 or S4, no bruits heard " and grade 2/6 systolic ejection type murmur heard best over the base of the heart and left sternal border.  No radiation to the neck or axilla.  ABDOMEN: soft, no tenderness, no  hepatosplenomegaly, no masses, normal bowel sounds  ABDOMEN: Significantly obese abdomen  MS: extremities- no gross deformities noted, no edema  SKIN: no suspicious lesions, no rashes  NEURO: strength and tone- normal, sensory exam- grossly normal, mentation- intact, speech- normal, reflexes- symmetric  Diabetic foot exam: normal DP and PT pulses, no trophic changes or ulcerative lesions and normal sensory exam  BACK: no CVA tenderness, no paralumbar tenderness  PSYCH: Alert and oriented times 3; speech- coherent , normal rate and volume; able to articulate logical thoughts, able to abstract reason, no tangential thoughts, no hallucinations or delusions, affect- normal  LYMPHATICS: ant. cervical- normal, post. cervical- normal, axillary- normal, supraclavicular- normal, inguinal- normal    Diagnostic test results:  Diagnostic Test Results:  Results for orders placed or performed in visit on 04/12/18 (from the past 24 hour(s))   Hemoglobin A1c   Result Value Ref Range    Hemoglobin A1C 6.2 0 - 6.4 %   CBC with platelets   Result Value Ref Range    WBC 12.1 (H) 4.0 - 11.0 10e9/L    RBC Count 3.15 (L) 4.4 - 5.9 10e12/L    Hemoglobin 10.7 (L) 13.3 - 17.7 g/dL    Hematocrit 33.3 (L) 40.0 - 53.0 %     (H) 78 - 100 fl    MCH 34.0 (H) 26.5 - 33.0 pg    MCHC 32.1 31.5 - 36.5 g/dL    RDW 19.2 (H) 10.0 - 15.0 %    Platelet Count 218 150 - 450 10e9/L   *UA reflex to Microscopic   Result Value Ref Range    Color Urine Yellow     Appearance Urine Clear     Glucose Urine Negative NEG^Negative mg/dL    Bilirubin Urine Negative NEG^Negative    Ketones Urine Negative NEG^Negative mg/dL    Specific Gravity Urine 1.020 1.003 - 1.035    Blood Urine Negative NEG^Negative    pH Urine 5.5 5.0 - 7.0 pH    Protein Albumin Urine 100 (A) NEG^Negative mg/dL     Urobilinogen Urine 0.2 0.2 - 1.0 EU/dL    Nitrite Urine Negative NEG^Negative    Leukocyte Esterase Urine Negative NEG^Negative    Source Midstream Urine    Urine Microscopic   Result Value Ref Range    WBC Urine 0 - 5 OTO5^0 - 5 /HPF    RBC Urine O - 2 OTO2^O - 2 /HPF        Xr Chest 2 Views    Result Date: 4/12/2018  CHEST TWO VIEWS   4/12/2018 11:18 AM HISTORY:  SOB (shortness of breath).     IMPRESSION: Respiratory motion artifact. Retrocardiac opacity cannot be entirely excluded. The upper lungs appear clear. No apparent pleural effusion. VIOLET REYNOSO MD    ASSESSMENT/PLAN:                                                    1. Screening for diabetic peripheral neuropathy  Foot exam is relatively normal.  - FOOT EXAM  NO CHARGE [79983.114]  - Urine Microscopic    2. Type 2 diabetes mellitus with hyperglycemia, with long-term current use of insulin (H)  A1c is excellent.  May be slightly skewed to be a lower level because of anemia.  Remains fairly stable.  Agree with adjustments of Lantus and follow-up in 3 months.  - insulin glargine (LANTUS SOLOSTAR) 100 UNIT/ML injection; INJECT 50 UNITS EVERY 24 HOURS  Dispense: 15 mL; Refill: 2  - Hemoglobin A1c  - Comprehensive metabolic panel  - Lipid panel reflex to direct LDL Fasting    3. MDS (myelodysplastic syndrome) (H)  Hemoglobin appears relatively stable.  Continue to monitor every 3 months.  - insulin glargine (LANTUS SOLOSTAR) 100 UNIT/ML injection; INJECT 50 UNITS EVERY 24 HOURS  Dispense: 15 mL; Refill: 2  - CBC with platelets    4. CKD (chronic kidney disease) stage 3, GFR 30-59 ml/min  Remainder of metabolic panel pending.  Has been relatively stable in GFR.  - insulin glargine (LANTUS SOLOSTAR) 100 UNIT/ML injection; INJECT 50 UNITS EVERY 24 HOURS  Dispense: 15 mL; Refill: 2  - Albumin Random Urine Quantitative with Creat Ratio  - Comprehensive metabolic panel  - Lipid panel reflex to direct LDL Fasting  - *UA reflex to Microscopic    5. Morbid obesity  (H)  Long-term weight loss encouraged    6. Hypertension goal BP (blood pressure) < 140/80  Blood pressure appears adequately controlled  - insulin glargine (LANTUS SOLOSTAR) 100 UNIT/ML injection; INJECT 50 UNITS EVERY 24 HOURS  Dispense: 15 mL; Refill: 2  - Comprehensive metabolic panel    7. Hyperlipidemia LDL goal <100  Lipid panel pending.  Continue current statin therapy  - insulin glargine (LANTUS SOLOSTAR) 100 UNIT/ML injection; INJECT 50 UNITS EVERY 24 HOURS  Dispense: 15 mL; Refill: 2  - Comprehensive metabolic panel  - Lipid panel reflex to direct LDL Fasting    8. Other fatigue  Suspect secondary to comorbidities and anemia  - insulin glargine (LANTUS SOLOSTAR) 100 UNIT/ML injection; INJECT 50 UNITS EVERY 24 HOURS  Dispense: 15 mL; Refill: 2    9. SOB (shortness of breath)  Chest x-ray did not show any pulmonary fibrosis or pulmonary edema.  Inspiration was not ideal.  Some artifact noted by the radiologist.  Monitor for now clinically.  - XR Chest 2 Views; Future    10. Aortic valve calcification  Repeating echocardiogram to assess any increase in aortic stenosis given his current symptoms.  - Echocardiogram Complete; Future    11. Systolic murmur  Rechecking aortic valve  - Echocardiogram Complete; Future    12. Microalbuminuria  Awaiting results of testing.    When discussing the possibility of needing a CT scan of the chest to further evaluate the lungs, the patient essentially declined.  Will monitor clinically.      Follow up with Provider -Follow-up in 3 months or as needed.  Patient did state that he may not complete the Echocardiogram.  See Patient Instructions    The patient understood the rational for the diagnosis and treatment plan. All questions were answered to best of my ability and the patient's satisfaction.    Note: Chart documentation done in part with Dragon Voice Recognition software. Although reviewed after completion, some word and grammatical errors may remain.  Please consider  this when interpreting information in this chart.      Anibal Vizcaino MD  [facility]

## 2018-04-12 ENCOUNTER — OFFICE VISIT (OUTPATIENT)
Dept: FAMILY MEDICINE | Facility: CLINIC | Age: 83
End: 2018-04-12
Payer: COMMERCIAL

## 2018-04-12 ENCOUNTER — RADIANT APPOINTMENT (OUTPATIENT)
Dept: GENERAL RADIOLOGY | Facility: CLINIC | Age: 83
End: 2018-04-12
Attending: FAMILY MEDICINE
Payer: COMMERCIAL

## 2018-04-12 VITALS
SYSTOLIC BLOOD PRESSURE: 130 MMHG | OXYGEN SATURATION: 98 % | RESPIRATION RATE: 16 BRPM | HEART RATE: 68 BPM | DIASTOLIC BLOOD PRESSURE: 50 MMHG | WEIGHT: 246.9 LBS | HEIGHT: 69 IN | TEMPERATURE: 97.8 F | BODY MASS INDEX: 36.57 KG/M2

## 2018-04-12 DIAGNOSIS — E11.65 TYPE 2 DIABETES MELLITUS WITH HYPERGLYCEMIA, WITH LONG-TERM CURRENT USE OF INSULIN (H): Primary | ICD-10-CM

## 2018-04-12 DIAGNOSIS — E78.5 HYPERLIPIDEMIA LDL GOAL <100: ICD-10-CM

## 2018-04-12 DIAGNOSIS — R06.02 SOB (SHORTNESS OF BREATH): ICD-10-CM

## 2018-04-12 DIAGNOSIS — I10 HYPERTENSION GOAL BP (BLOOD PRESSURE) < 140/80: ICD-10-CM

## 2018-04-12 DIAGNOSIS — R53.83 OTHER FATIGUE: ICD-10-CM

## 2018-04-12 DIAGNOSIS — I35.9 AORTIC VALVE CALCIFICATION: ICD-10-CM

## 2018-04-12 DIAGNOSIS — Z79.4 TYPE 2 DIABETES MELLITUS WITH HYPERGLYCEMIA, WITH LONG-TERM CURRENT USE OF INSULIN (H): Primary | ICD-10-CM

## 2018-04-12 DIAGNOSIS — R80.9 MICROALBUMINURIA: ICD-10-CM

## 2018-04-12 DIAGNOSIS — D46.9 MDS (MYELODYSPLASTIC SYNDROME) (H): ICD-10-CM

## 2018-04-12 DIAGNOSIS — E66.01 MORBID OBESITY (H): ICD-10-CM

## 2018-04-12 DIAGNOSIS — N18.30 CKD (CHRONIC KIDNEY DISEASE) STAGE 3, GFR 30-59 ML/MIN (H): ICD-10-CM

## 2018-04-12 DIAGNOSIS — R01.1 SYSTOLIC MURMUR: ICD-10-CM

## 2018-04-12 LAB
ALBUMIN SERPL-MCNC: 4.1 G/DL (ref 3.4–5)
ALBUMIN UR-MCNC: 100 MG/DL
ALP SERPL-CCNC: 63 U/L (ref 40–150)
ALT SERPL W P-5'-P-CCNC: 16 U/L (ref 0–70)
ANION GAP SERPL CALCULATED.3IONS-SCNC: 5 MMOL/L (ref 3–14)
APPEARANCE UR: CLEAR
AST SERPL W P-5'-P-CCNC: 15 U/L (ref 0–45)
BILIRUB SERPL-MCNC: 0.9 MG/DL (ref 0.2–1.3)
BILIRUB UR QL STRIP: NEGATIVE
BUN SERPL-MCNC: 24 MG/DL (ref 7–30)
CALCIUM SERPL-MCNC: 8.7 MG/DL (ref 8.5–10.1)
CHLORIDE SERPL-SCNC: 109 MMOL/L (ref 94–109)
CHOLEST SERPL-MCNC: 101 MG/DL
CO2 SERPL-SCNC: 26 MMOL/L (ref 20–32)
COLOR UR AUTO: YELLOW
CREAT SERPL-MCNC: 1.53 MG/DL (ref 0.66–1.25)
CREAT UR-MCNC: 178 MG/DL
ERYTHROCYTE [DISTWIDTH] IN BLOOD BY AUTOMATED COUNT: 19.2 % (ref 10–15)
GFR SERPL CREATININE-BSD FRML MDRD: 44 ML/MIN/1.7M2
GLUCOSE SERPL-MCNC: 96 MG/DL (ref 70–99)
GLUCOSE UR STRIP-MCNC: NEGATIVE MG/DL
HBA1C MFR BLD: 6.2 % (ref 0–6.4)
HCT VFR BLD AUTO: 33.3 % (ref 40–53)
HDLC SERPL-MCNC: 40 MG/DL
HGB BLD-MCNC: 10.7 G/DL (ref 13.3–17.7)
HGB UR QL STRIP: NEGATIVE
KETONES UR STRIP-MCNC: NEGATIVE MG/DL
LDLC SERPL CALC-MCNC: 42 MG/DL
LEUKOCYTE ESTERASE UR QL STRIP: NEGATIVE
MCH RBC QN AUTO: 34 PG (ref 26.5–33)
MCHC RBC AUTO-ENTMCNC: 32.1 G/DL (ref 31.5–36.5)
MCV RBC AUTO: 106 FL (ref 78–100)
MICROALBUMIN UR-MCNC: 340 MG/L
MICROALBUMIN/CREAT UR: 191.01 MG/G CR (ref 0–17)
NITRATE UR QL: NEGATIVE
NONHDLC SERPL-MCNC: 61 MG/DL
PH UR STRIP: 5.5 PH (ref 5–7)
PLATELET # BLD AUTO: 218 10E9/L (ref 150–450)
POTASSIUM SERPL-SCNC: 4.8 MMOL/L (ref 3.4–5.3)
PROT SERPL-MCNC: 7.5 G/DL (ref 6.8–8.8)
RBC # BLD AUTO: 3.15 10E12/L (ref 4.4–5.9)
RBC #/AREA URNS AUTO: NORMAL /HPF
SODIUM SERPL-SCNC: 140 MMOL/L (ref 133–144)
SOURCE: ABNORMAL
SP GR UR STRIP: 1.02 (ref 1–1.03)
TRIGL SERPL-MCNC: 94 MG/DL
UROBILINOGEN UR STRIP-ACNC: 0.2 EU/DL (ref 0.2–1)
WBC # BLD AUTO: 12.1 10E9/L (ref 4–11)
WBC #/AREA URNS AUTO: NORMAL /HPF

## 2018-04-12 PROCEDURE — 82043 UR ALBUMIN QUANTITATIVE: CPT | Performed by: FAMILY MEDICINE

## 2018-04-12 PROCEDURE — 80053 COMPREHEN METABOLIC PANEL: CPT | Performed by: FAMILY MEDICINE

## 2018-04-12 PROCEDURE — 99207 C FOOT EXAM  NO CHARGE: CPT | Performed by: FAMILY MEDICINE

## 2018-04-12 PROCEDURE — 71046 X-RAY EXAM CHEST 2 VIEWS: CPT | Mod: FY

## 2018-04-12 PROCEDURE — 81001 URINALYSIS AUTO W/SCOPE: CPT | Performed by: FAMILY MEDICINE

## 2018-04-12 PROCEDURE — 83036 HEMOGLOBIN GLYCOSYLATED A1C: CPT | Performed by: FAMILY MEDICINE

## 2018-04-12 PROCEDURE — 36415 COLL VENOUS BLD VENIPUNCTURE: CPT | Performed by: FAMILY MEDICINE

## 2018-04-12 PROCEDURE — 85027 COMPLETE CBC AUTOMATED: CPT | Performed by: FAMILY MEDICINE

## 2018-04-12 PROCEDURE — 99214 OFFICE O/P EST MOD 30 MIN: CPT | Performed by: FAMILY MEDICINE

## 2018-04-12 PROCEDURE — 80061 LIPID PANEL: CPT | Performed by: FAMILY MEDICINE

## 2018-04-12 ASSESSMENT — PAIN SCALES - GENERAL: PAINLEVEL: NO PAIN (0)

## 2018-04-12 NOTE — PATIENT INSTRUCTIONS
These are new changes to your current plan of care based on today's visit:    Medications stopped    Medications to be started    Change dose of this medication Continue Lantus insulin at 50 units daily   New treatments        Follow up appointments:    1.  FOLLOW UP WITH YOUR PRIMARY CARE PROVIDER: 3 month(s) for clinic visit with fasting blood work     2. FOLLOW UP WITH SPECIALIST:     3. FOLLOW UP WITH NURSE VISIT:     4. IMAGING STUDIES TO BE SCHEDULED: Echocardiogram at [place]    Anibal Vizcaino MD

## 2018-04-12 NOTE — LETTER
April 13, 2018      Alex Armenta  [address]  [facility] [address]              Dear Alex,    Candelario is a copy of the laboratory studies and chest x-ray that was done in the clinic.  Results as follows:    1.  Your blood sugar was 96 when we checked your blood studies.  The A1c was 6.2% and stable.  The adjustments in insulin that were made should continue with the reduce Lantus insulin.  2.  Kidney function is essentially normal, still mildly compromised.  The protein in the urine did go up from last visit and will be monitored.  Liver enzymes were normal.  3.  Your lipid profile is excellent.  The urine otherwise was normal.  4.  Hemoglobin actually went up a small amount to 10.7.  The abnormal findings related to the bone marrow are stable.  5.  The radiologist saw no evidence of pulmonary fibrosis, no evidence of fluid around the lungs, pleural effusions or infiltrates.  There was a little bit of motion when you had the chest x-ray but for the most part appeared fine.    Follow-up in 3 months as planned. Please call with any questions or concerns and thank you for your confidence.            Sincerely,      Anibal Vizcaino MD

## 2018-04-12 NOTE — MR AVS SNAPSHOT
After Visit Summary   4/12/2018    Alex Armenta    MRN: [id number]           Patient Information     Date Of Birth          1935        Visit Information        Provider Department      4/12/2018 10:00 AM Anibal Vizcaino MD [facility]        Today's Diagnoses     Screening for diabetic peripheral neuropathy    -  1    Type 2 diabetes mellitus with hyperglycemia, with long-term current use of insulin (H)        MDS (myelodysplastic syndrome) (H)        CKD (chronic kidney disease) stage 3, GFR 30-59 ml/min        Hypertension goal BP (blood pressure) < 140/80        Hyperlipidemia LDL goal <100        Other fatigue        SOB (shortness of breath)        Aortic valve calcification        Systolic murmur          Care Instructions    These are new changes to your current plan of care based on today's visit:    Medications stopped    Medications to be started    Change dose of this medication Continue Lantus insulin at 50 units daily   New treatments        Follow up appointments:    1.  FOLLOW UP WITH YOUR PRIMARY CARE PROVIDER: 3 month(s) for clinic visit with fasting blood work     2. FOLLOW UP WITH SPECIALIST:     3. FOLLOW UP WITH NURSE VISIT:     4. IMAGING STUDIES TO BE SCHEDULED: Echocardiogram at [place]    Anibal Vizcaino MD                Follow-ups after your visit        Follow-up notes from your care team     Return in about 3 months (around 7/12/2018) for Routine Visit, Lab Work.      Future tests that were ordered for you today     Open Future Orders        Priority Expected Expires Ordered    Echocardiogram Complete Routine  4/12/2019 4/12/2018    XR Chest 2 Views Routine 4/12/2018 4/12/2019 4/12/2018            Who to contact     If you have questions or need follow up information about today's clinic visit or your schedule please contact [facility] directly at [phone].  Normal or non-critical lab and imaging results will be communicated to  "you by MyChart, letter or phone within 4 business days after the clinic has received the results. If you do not hear from us within 7 days, please contact the clinic through [employer] or phone. If you have a critical or abnormal lab result, we will notify you by phone as soon as possible.  Submit refill requests through [employer] or call your pharmacy and they will forward the refill request to us. Please allow 3 business days for your refill to be completed.          Additional Information About Your Visit        [employer] Information     [employer] lets you send messages to your doctor, view your test results, renew your prescriptions, schedule appointments and more. To sign up, go to [email or web address] . Click on \"Log in\" on the left side of the screen, which will take you to the Welcome page. Then click on \"Sign up Now\" on the right side of the page.     You will be asked to enter the access code listed below, as well as some personal information. Please follow the directions to create your username and password.     Your access code is: [email or web address]  Expires: 2018 11:01 AM     Your access code will  in 90 days. If you need help or a new code, please call your [facility] clinic or [phone].        Care EveryWhere ID     This is your Care EveryWhere ID. This could be used by other organizations to access your [facility] medical records  [id number]        Your Vitals Were     Pulse Temperature Respirations Height Pulse Oximetry BMI (Body Mass Index)    68 97.8  F (36.6  C) (Temporal) 16 5' 8.5\" (1.74 m) 98% 36.99 kg/m2       Blood Pressure from Last 3 Encounters:   18 130/50   17 122/56   17 128/70    Weight from Last 3 Encounters:   18 246 lb 14.4 oz (112 kg)   17 250 lb 14.4 oz (113.8 kg)   17 251 lb 1.6 oz (113.9 kg)              We Performed the Following     *UA reflex to Microscopic     Albumin Random Urine Quantitative with Creat Ratio     CBC with platelets     " Comprehensive metabolic panel     FOOT EXAM  NO CHARGE [79776.114]     Hemoglobin A1c     Lipid panel reflex to direct LDL Fasting          Today's Medication Changes          These changes are accurate as of 4/12/18 11:01 AM.  If you have any questions, ask your nurse or doctor.               These medicines have changed or have updated prescriptions.        Dose/Directions    HumaLOG KWIKpen 100 UNIT/ML injection   This may have changed:  Another medication with the same name was removed. Continue taking this medication, and follow the directions you see here.   Used for:  Type 2 diabetes mellitus with hyperglycemia, with long-term current use of insulin (H)   Generic drug:  insulin lispro   Changed by:  Anibal Vizcaino MD        INJECT 15 UNITS BEFORE BREAKFAST, 15 UNITS BEFORE LUNCH, 15 UNITS BEFORE DINNER   Quantity:  15 mL   Refills:  1       insulin glargine 100 UNIT/ML injection   Commonly known as:  LANTUS SOLOSTAR   This may have changed:  additional instructions   Used for:  Type 2 diabetes mellitus with hyperglycemia, with long-term current use of insulin (H), MDS (myelodysplastic syndrome) (H), CKD (chronic kidney disease) stage 3, GFR 30-59 ml/min, Hypertension goal BP (blood pressure) < 140/80, Hyperlipidemia LDL goal <100, Other fatigue   Changed by:  Anibal Vizcaino MD        INJECT 50 UNITS EVERY 24 HOURS   Quantity:  15 mL   Refills:  2            Where to get your medicines      Some of these will need a paper prescription and others can be bought over the counter.  Ask your nurse if you have questions.     You don't need a prescription for these medications     insulin glargine 100 UNIT/ML injection                Primary Care Provider Office Phone # Fax #    Anibal Vizcaino [phone] [address] [facility] [id number]        Equal Access to Services     MANE SYED AH: sole Garcia qaybta kaalmada adeegyada, waxay  ty hines rayo hernándezaan ah. So [facility] [phone].    ATENCIÓN: Si luanla burak, tiene a moreno disposición servicios gratuitos de asistencia lingüística. Romaine al [phone].    We comply with applicable federal civil rights laws and [place] laws. We do not discriminate on the basis of race, color, national origin, age, disability, sex, sexual orientation, or gender identity.            Thank you!     Thank you for choosing [facility]  for your care. Our goal is always to provide you with excellent care. Hearing back from our patients is one way we can continue to improve our services. Please take a few minutes to complete the written survey that you may receive in the mail after your visit with us. Thank you!             Your Updated Medication List - Protect others around you: Learn how to safely use, store and throw away your medicines at www.disposemymeds.org.          This list is accurate as of 4/12/18 11:01 AM.  Always use your most recent med list.                   Brand Name Dispense Instructions for use Diagnosis    aspirin 81 MG tablet     100 tablet    Take 1 tablet by mouth daily.        * blood glucose monitoring test strip    no brand specified     Use 3 times a day to test blood sugars.        * blood glucose monitoring test strip    ONETOUCH ULTRA    100 each    USE 1 STRIP TO TEST 4 TIMES DAILY    Type 2 diabetes mellitus with diabetic nephropathy, without long-term current use of insulin (H)       BLOOD GLUCOSE TEST STRIPS STRP     100 Strip    One Touch Ultra-test 3 times daily (on insulin)    Diabetes mellitus, type 2 (H)       enalapril 20 MG tablet    VASOTEC    90 tablet    TAKE 1 TABLET BY MOUTH ONCE DAILY    Hypertension goal BP (blood pressure) < 140/80       HumaLOG KWIKpen 100 UNIT/ML injection   Generic drug:  insulin lispro     15 mL    INJECT 15 UNITS BEFORE BREAKFAST, 15 UNITS BEFORE LUNCH, 15 UNITS BEFORE DINNER    Type 2 diabetes mellitus with hyperglycemia,  with long-term current use of insulin (H)       insulin glargine 100 UNIT/ML injection    LANTUS SOLOSTAR    15 mL    INJECT 50 UNITS EVERY 24 HOURS    Type 2 diabetes mellitus with hyperglycemia, with long-term current use of insulin (H), MDS (myelodysplastic syndrome) (H), CKD (chronic kidney disease) stage 3, GFR 30-59 ml/min, Hypertension goal BP (blood pressure) < 140/80, Hyperlipidemia LDL goal <100, Other fatigue       * insulin pen needle 30G X 8 MM    NOVOFINE    400 each    1 Units 4 times daily        * insulin pen needle 30G X 8 MM    NOVOFINE    400 each    Use 4 daily or as directed.    Type 2 diabetes mellitus with hyperglycemia, with long-term current use of insulin (H)       metFORMIN 750 MG 24 hr tablet    GLUCOPHAGE-XR    180 tablet    TAKE 1 TABLET (750 MG) BY MOUTH 2 TIMES DAILY (WITH MEALS)    Type 2 diabetes mellitus with hyperglycemia, with long-term current use of insulin (H)       Multi-vitamin Tabs tablet      Take 1 tablet by mouth daily        order for DME     300 each    Equipment being ordered: Lancets of choice for diabetic testing    Type 2 diabetes, HbA1C goal < 8% (H)       order for DME     1 Units    Equipment being ordered:   New home glucometer of choice Test strips for new glucometer for three times daily monitoring--on insulin therapy  Anibal Vizcaino MD    Type 2 diabetes, HbA1C goal < 8% (H)       order for DME     400 strip    Equipment being ordered: One Touch Ultra Glucometer Test Strips ( on insulin therapy )  To be used four times daily for glucose monitoring.    Diabetes mellitus, type 2 (H)       order for DME     300 Units    Equipment being ordered: DELICA  Lancets--uses three time daily    Type 2 diabetes, HbA1C goal < 8% (H)       pravastatin 10 MG tablet    PRAVACHOL    90 tablet    Take 1 tablet (10 mg) by mouth daily    Hyperlipidemia LDL goal <100       * Notice:  This list has 4 medication(s) that are the same as other medications prescribed for you. Read  the directions carefully, and ask your doctor or other care provider to review them with you.

## 2018-05-15 DIAGNOSIS — Z79.4 TYPE 2 DIABETES MELLITUS WITH HYPERGLYCEMIA, WITH LONG-TERM CURRENT USE OF INSULIN (H): ICD-10-CM

## 2018-05-15 DIAGNOSIS — E11.65 TYPE 2 DIABETES MELLITUS WITH HYPERGLYCEMIA, WITH LONG-TERM CURRENT USE OF INSULIN (H): ICD-10-CM

## 2018-05-17 RX ORDER — INSULIN LISPRO 100 [IU]/ML
INJECTION, SOLUTION INTRAVENOUS; SUBCUTANEOUS
Qty: 15 ML | Refills: 0 | Status: SHIPPED | OUTPATIENT
Start: 2018-05-17 | End: 2018-07-16

## 2018-05-17 NOTE — TELEPHONE ENCOUNTER
Humalog Kwikpen     Prescription approved per [facility] Refill Protocol.    Ryann Ocampo, RN, BSN

## 2018-05-21 DIAGNOSIS — E11.21 TYPE 2 DIABETES MELLITUS WITH DIABETIC NEPHROPATHY, WITHOUT LONG-TERM CURRENT USE OF INSULIN (H): ICD-10-CM

## 2018-05-22 NOTE — TELEPHONE ENCOUNTER
"Requested Prescriptions   Pending Prescriptions Disp Refills     ONETOUCH ULTRA test strip [Pharmacy Med Name: OneTouch Ultra Blue In Vitro Strip] 100 each 2     Sig: use 1 strip to test 4 times daily    Diabetic Supplies Protocol Passed    5/21/2018  1:56 PM       Passed - Patient is 18 years of age or older       Passed - Recent (6 mo) or future (30 days) visit within the authorizing provider's specialty    Patient had office visit in the last 6 months or has a visit in the next 30 days with authorizing provider.  See \"Patient Info\" tab in inbasket, or \"Choose Columns\" in Meds & Orders section of the refill encounter.            Prescription approved per [facility] Refill Protocol.    Liliam Arteaga RN    "

## 2018-07-16 DIAGNOSIS — Z79.4 TYPE 2 DIABETES MELLITUS WITH HYPERGLYCEMIA, WITH LONG-TERM CURRENT USE OF INSULIN (H): ICD-10-CM

## 2018-07-16 DIAGNOSIS — E11.65 TYPE 2 DIABETES MELLITUS WITH HYPERGLYCEMIA, WITH LONG-TERM CURRENT USE OF INSULIN (H): ICD-10-CM

## 2018-07-16 DIAGNOSIS — E78.5 HYPERLIPIDEMIA LDL GOAL <100: ICD-10-CM

## 2018-07-18 RX ORDER — PRAVASTATIN SODIUM 10 MG
TABLET ORAL
Qty: 90 TABLET | Refills: 0 | Status: SHIPPED | OUTPATIENT
Start: 2018-07-18 | End: 2018-12-03

## 2018-07-18 RX ORDER — INSULIN LISPRO 100 [IU]/ML
INJECTION, SOLUTION INTRAVENOUS; SUBCUTANEOUS
Qty: 15 ML | Refills: 0 | Status: SHIPPED | OUTPATIENT
Start: 2018-07-18 | End: 2018-08-26

## 2018-07-18 NOTE — TELEPHONE ENCOUNTER
Humalog    Medication is being filled for 1 time refill only due to:  Patient needs to be seen because Diabetic F/U.     Pravastatin    Medication is being filled for 1 time refill only due to:  Patient needs to be seen because Diabetic F/U.     Please help schedule OV      Ryann Ocampo, RN, BSN

## 2018-07-24 DIAGNOSIS — E11.65 TYPE 2 DIABETES MELLITUS WITH HYPERGLYCEMIA, WITH LONG-TERM CURRENT USE OF INSULIN (H): ICD-10-CM

## 2018-07-24 DIAGNOSIS — Z79.4 TYPE 2 DIABETES MELLITUS WITH HYPERGLYCEMIA, WITH LONG-TERM CURRENT USE OF INSULIN (H): ICD-10-CM

## 2018-07-25 RX ORDER — INSULIN GLARGINE 100 [IU]/ML
INJECTION, SOLUTION SUBCUTANEOUS
Qty: 60 ML | Refills: 1 | Status: SHIPPED | OUTPATIENT
Start: 2018-07-25 | End: 2018-08-02

## 2018-07-25 NOTE — TELEPHONE ENCOUNTER
"Requested Prescriptions   Pending Prescriptions Disp Refills     LANTUS SOLOSTAR 100 UNIT/ML soln [Pharmacy Med Name: Lantus SoloStar Subcutaneous Solution Pen-injector 100 UNIT/ML] 60 mL 2     Sig: INJECT 90 UNITS EVERY 24 HOURS    Long Acting Insulin Protocol Passed    7/24/2018 11:30 AM       Passed - Blood pressure less than 140/90 in past 6 months    BP Readings from Last 3 Encounters:   04/12/18 130/50   12/01/17 122/56   08/04/17 128/70                Passed - LDL on file in past 12 months    Recent Labs   Lab Test  04/12/18   1123   LDL  42            Passed - Microalbumin on file in past 12 months    Recent Labs   Lab Test  04/12/18   1123   MICROL  340   UMALCR  191.01*            Passed - Serum creatinine on file in past 12 months    Recent Labs   Lab Test  04/12/18   1123   CR  1.53*            Passed - HgbA1C in past 3 or 6 months    If HgbA1C is 8 or greater, it needs to be on file within the past 3 months.  If less than 8, must be on file within the past 6 months.     Recent Labs   Lab Test  04/12/18   1123   A1C  6.2            Passed - Patient is age 18 or older       Passed - Recent (6 mo) or future (30 days) visit within the authorizing provider's specialty    Patient had office visit in the last 6 months or has a visit in the next 30 days with authorizing provider or within the authorizing provider's specialty.  See \"Patient Info\" tab in inbasket, or \"Choose Columns\" in Meds & Orders section of the refill encounter.            Last OV:  04/12/2018    Prescription approved per [facility] Refill Protocol.    Erasto Lopez, RN, BSN    "

## 2018-07-27 ENCOUNTER — TELEPHONE (OUTPATIENT)
Dept: LAB | Facility: CLINIC | Age: 83
End: 2018-07-27

## 2018-07-27 DIAGNOSIS — E11.65 TYPE 2 DIABETES MELLITUS WITH HYPERGLYCEMIA, WITH LONG-TERM CURRENT USE OF INSULIN (H): Primary | ICD-10-CM

## 2018-07-27 DIAGNOSIS — R77.8 ABNORMAL SERUM PROTEIN ELECTROPHORESIS: ICD-10-CM

## 2018-07-27 DIAGNOSIS — D46.9 MDS (MYELODYSPLASTIC SYNDROME) (H): ICD-10-CM

## 2018-07-27 DIAGNOSIS — R80.9 MICROALBUMINURIA: ICD-10-CM

## 2018-07-27 DIAGNOSIS — I10 HYPERTENSION GOAL BP (BLOOD PRESSURE) < 140/80: ICD-10-CM

## 2018-07-27 DIAGNOSIS — Z79.4 TYPE 2 DIABETES MELLITUS WITH HYPERGLYCEMIA, WITH LONG-TERM CURRENT USE OF INSULIN (H): ICD-10-CM

## 2018-07-27 DIAGNOSIS — E78.5 HYPERLIPIDEMIA LDL GOAL <100: ICD-10-CM

## 2018-07-27 DIAGNOSIS — D53.9 MACROCYTIC ANEMIA: ICD-10-CM

## 2018-07-27 DIAGNOSIS — E11.65 TYPE 2 DIABETES MELLITUS WITH HYPERGLYCEMIA, WITH LONG-TERM CURRENT USE OF INSULIN (H): ICD-10-CM

## 2018-07-27 DIAGNOSIS — N18.30 CKD (CHRONIC KIDNEY DISEASE) STAGE 3, GFR 30-59 ML/MIN (H): ICD-10-CM

## 2018-07-27 DIAGNOSIS — Z79.4 TYPE 2 DIABETES MELLITUS WITH HYPERGLYCEMIA, WITH LONG-TERM CURRENT USE OF INSULIN (H): Primary | ICD-10-CM

## 2018-07-27 LAB
ALBUMIN SERPL-MCNC: 3.9 G/DL (ref 3.4–5)
ALP SERPL-CCNC: 60 U/L (ref 40–150)
ALT SERPL W P-5'-P-CCNC: 22 U/L (ref 0–70)
ANION GAP SERPL CALCULATED.3IONS-SCNC: 7 MMOL/L (ref 3–14)
AST SERPL W P-5'-P-CCNC: 16 U/L (ref 0–45)
BILIRUB SERPL-MCNC: 0.9 MG/DL (ref 0.2–1.3)
BUN SERPL-MCNC: 26 MG/DL (ref 7–30)
CALCIUM SERPL-MCNC: 8.8 MG/DL (ref 8.5–10.1)
CHLORIDE SERPL-SCNC: 111 MMOL/L (ref 94–109)
CHOLEST SERPL-MCNC: 87 MG/DL
CO2 SERPL-SCNC: 25 MMOL/L (ref 20–32)
CREAT SERPL-MCNC: 1.47 MG/DL (ref 0.66–1.25)
CREAT UR-MCNC: 117 MG/DL
ERYTHROCYTE [DISTWIDTH] IN BLOOD BY AUTOMATED COUNT: 19.7 % (ref 10–15)
GFR SERPL CREATININE-BSD FRML MDRD: 46 ML/MIN/1.7M2
GLUCOSE SERPL-MCNC: 54 MG/DL (ref 70–99)
HBA1C MFR BLD: 5.8 % (ref 0–5.6)
HCT VFR BLD AUTO: 29 % (ref 40–53)
HDLC SERPL-MCNC: 35 MG/DL
HGB BLD-MCNC: 9.3 G/DL (ref 13.3–17.7)
LDLC SERPL CALC-MCNC: 37 MG/DL
MCH RBC QN AUTO: 34.7 PG (ref 26.5–33)
MCHC RBC AUTO-ENTMCNC: 32.1 G/DL (ref 31.5–36.5)
MCV RBC AUTO: 108 FL (ref 78–100)
MICROALBUMIN UR-MCNC: 232 MG/L
MICROALBUMIN/CREAT UR: 198.29 MG/G CR (ref 0–17)
NONHDLC SERPL-MCNC: 52 MG/DL
PLATELET # BLD AUTO: 193 10E9/L (ref 150–450)
POTASSIUM SERPL-SCNC: 5 MMOL/L (ref 3.4–5.3)
PROT SERPL-MCNC: 7.3 G/DL (ref 6.8–8.8)
RBC # BLD AUTO: 2.68 10E12/L (ref 4.4–5.9)
SODIUM SERPL-SCNC: 143 MMOL/L (ref 133–144)
TRIGL SERPL-MCNC: 73 MG/DL
WBC # BLD AUTO: 9.2 10E9/L (ref 4–11)

## 2018-07-27 PROCEDURE — 80053 COMPREHEN METABOLIC PANEL: CPT | Performed by: FAMILY MEDICINE

## 2018-07-27 PROCEDURE — 80061 LIPID PANEL: CPT | Performed by: FAMILY MEDICINE

## 2018-07-27 PROCEDURE — 85027 COMPLETE CBC AUTOMATED: CPT | Performed by: FAMILY MEDICINE

## 2018-07-27 PROCEDURE — 36415 COLL VENOUS BLD VENIPUNCTURE: CPT | Performed by: FAMILY MEDICINE

## 2018-07-27 PROCEDURE — 86334 IMMUNOFIX E-PHORESIS SERUM: CPT | Performed by: FAMILY MEDICINE

## 2018-07-27 PROCEDURE — 82043 UR ALBUMIN QUANTITATIVE: CPT | Performed by: FAMILY MEDICINE

## 2018-07-27 PROCEDURE — 83036 HEMOGLOBIN GLYCOSYLATED A1C: CPT | Performed by: FAMILY MEDICINE

## 2018-07-27 PROCEDURE — 82784 ASSAY IGA/IGD/IGG/IGM EACH: CPT | Performed by: FAMILY MEDICINE

## 2018-07-30 LAB
IGA SERPL-MCNC: 185 MG/DL (ref 70–380)
IGG SERPL-MCNC: 958 MG/DL (ref 695–1620)
IGM SERPL-MCNC: 51 MG/DL (ref 60–265)
PROT PATTERN SERPL IFE-IMP: ABNORMAL

## 2018-07-30 NOTE — PROGRESS NOTES
SUBJECTIVE:   Alex Armenta is a 83 year old male who presents to clinic today for the following health issues:      HPI  Diabetes Follow-up--    Currently on insulin therapy, basal and rapid acting.  Fasting glucose was 54 2 days ago.  Patient does not have any major symptoms related to blood pressure readings below 60.  He has not had any syncopal episodes.  Insulin may be lasting somewhat longer due to compromised renal status.     Patient is checking blood sugars: twice daily.    Results are as follows:         am - this morning 82,  Normal range 82 - 150     Diabetic concerns: None     Symptoms of hypoglycemia (low blood sugar): numbness in the fingers      Paresthesias (numbness or burning in feet) or sores: No     Date of last diabetic eye exam: Judie Jimenez , March 2018      BP Readings from Last 2 Encounters:   04/12/18 130/50   12/01/17 122/56     Hemoglobin A1C (%)   Date Value   07/27/2018 5.8 (H)   04/12/2018 6.2     LDL Cholesterol Calculated (mg/dL)   Date Value   07/27/2018 37   04/12/2018 42       Diabetes Management Resources  Hyperlipidemia Follow-Up      Rate your low fat/cholesterol diet?: not monitoring fat    Taking statin?  Yes, no muscle aches from statin    Other lipid medications/supplements?:  none    Hypertension Follow-up      Outpatient blood pressures are not being checked.    Low Salt Diet: not monitoring salt    Chronic Kidney Disease Follow-up      Current NSAID use?  Aspirin 81 Mg      Problem list and histories reviewed & adjusted, as indicated.  Additional history: as documented    Has long-standing findings of macrocytosis with persistent anemia, likely secondary to bone marrow compromise, likely MDS.  Have discussed every visit with the patient about seeing hematology and possibly considering a bone marrow biopsy for definitive diagnosis.  He is declining any referral.  Currently has some fatigue and exertional shortness of breath from the anemia but otherwise has been  stable.    Recent Labs   Lab Test  07/27/18   1320  04/12/18   1123  12/01/17   0954  08/04/17   1019   03/17/16   0957   A1C  5.8*  6.2  6.2*  6.3*   < >  6.6*   LDL  37  42  35  43   < >  57   HDL  35*  40  39*  34*   < >  40   TRIG  73  94  108  72   < >  104   ALT  22  16  23  21   < >  23   CR  1.47*  1.53*  1.40*  1.40*   < >  1.21   GFRESTIMATED  46*  44*  49*  49*   < >  58*   GFRESTBLACK  55*  53*  59*  59*   < >  70   POTASSIUM  5.0  4.8  4.6  4.9   < >  4.6   TSH   --    --    --   2.14   --   2.43    < > = values in this interval not displayed.      BP Readings from Last 3 Encounters:   08/02/18 126/58   04/12/18 130/50   12/01/17 122/56    Wt Readings from Last 3 Encounters:   08/02/18 243 lb 9.6 oz (110.5 kg)   04/12/18 246 lb 14.4 oz (112 kg)   12/01/17 250 lb 14.4 oz (113.8 kg)                    ROS:  CONSTITUTIONAL: NEGATIVE for fever, chills, change in weight  CONSTITUTIONAL: Continues morbidly obese but otherwise appears stable.  General fatigue present.  Not disabling.  INTEGUMENTARY/SKIN: NEGATIVE for worrisome rashes, moles or lesions  EYES: NEGATIVE for vision changes or irritation  ENT/MOUTH: NEGATIVE for ear, mouth and throat problems  RESP: Reports some mild shortness of breath with prolonged walking, likely contributed to by significant anemia with a hemoglobin of 9.3.  No wheezing or coughing.  CV: NEGATIVE for chest pain, palpitations or peripheral edema  CV: No anginal symptoms suggested with exertional shortness of breath.  GI: NEGATIVE for nausea, abdominal pain, heartburn, or change in bowel habits   male : No major urinary changes.  MUSCULOSKELETAL: NEGATIVE for significant arthralgias or myalgia  MUSCULOSKELETAL: Mild edema of the lower extremities but no clinical suggestion of DVT.  NEURO: NEGATIVE for weakness, dizziness or paresthesias  ENDOCRINE: NEGATIVE for temperature intolerance, skin/hair changes  ENDOCRINE: On type 2 insulin-dependent diabetes.  A1c consistently less  "than 6.5% but likely artificially lower than a true reading due to his anemia.  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  Patient has declined repeatedly to have this evaluated by hematologist.  Has been screened multiple times for multiple myeloma and immunoelectrophoresis is have been normal.  PSYCHIATRIC: NEGATIVE for changes in mood or affect    OBJECTIVE:                                                    /58  Pulse 83  Temp 97.1  F (36.2  C) (Temporal)  Resp 18  Ht 5' 8.5\" (1.74 m)  Wt 243 lb 9.6 oz (110.5 kg)  SpO2 96%  BMI 36.5 kg/m2  Body mass index is 36.5 kg/(m^2).  GENERAL: alert, no distress, cooperative, pale and over weight  EYES: Eyes grossly normal to inspection, extraocular movements - intact, and PERRL  HENT: ear canals- normal; TMs- normal; Nose- normal; Mouth- no ulcers, no lesions  NECK: no tenderness, no adenopathy, no asymmetry, no masses, no stiffness; thyroid- normal to palpation  RESP: lungs clear to auscultation - no rales, no rhonchi, no wheezes  CV: regular rates and rhythm, normal S1 S2, no S3 or S4, no irregular beats, no bruits heard and grade 2-3/6 soft systolic murmur heard best over the base of the heart without radiation to the neck or apex.  Echocardiogram in 2012 showed no significant valvular disease.  There was some aortic sclerosis with trace aortic regurgitation.  Patient has declined to update the echocardiogram since he is not noting any deterioration.  ABDOMEN: soft, no tenderness, no  hepatosplenomegaly, no masses, normal bowel sounds  MS: Mild edema of the distal lower extremities.  SKIN: no suspicious lesions, no rashes  SKIN: Pale appearing skin  NEURO: strength and tone- normal, sensory exam- grossly normal, mentation- intact, speech- normal, reflexes- symmetric  Diabetic foot exam: no trophic changes or ulcerative lesions, normal sensory exam, " DP reduced bilaterally and PT reduced bilaterally  BACK: no CVA tenderness, no paralumbar tenderness  PSYCH: Alert and oriented times 3; speech- coherent , normal rate and volume; able to articulate logical thoughts, able to abstract reason, no tangential thoughts, no hallucinations or delusions, affect- normal  LYMPHATICS: ant. cervical- normal, post. cervical- normal, axillary- normal, supraclavicular- normal, inguinal- normal    Diagnostic test results:  Diagnostic Test Results:  Results for orders placed or performed in visit on 07/27/18   Albumin Random Urine Quantitative with Creat Ratio   Result Value Ref Range    Creatinine Urine 117 mg/dL    Albumin Urine mg/L 232 mg/L    Albumin Urine mg/g Cr 198.29 (H) 0 - 17 mg/g Cr   Hemoglobin A1c   Result Value Ref Range    Hemoglobin A1C 5.8 (H) 0 - 5.6 %   Comprehensive metabolic panel   Result Value Ref Range    Sodium 143 133 - 144 mmol/L    Potassium 5.0 3.4 - 5.3 mmol/L    Chloride 111 (H) 94 - 109 mmol/L    Carbon Dioxide 25 20 - 32 mmol/L    Anion Gap 7 3 - 14 mmol/L    Glucose 54 (L) 70 - 99 mg/dL    Urea Nitrogen 26 7 - 30 mg/dL    Creatinine 1.47 (H) 0.66 - 1.25 mg/dL    GFR Estimate 46 (L) >60 mL/min/1.7m2    GFR Estimate If Black 55 (L) >60 mL/min/1.7m2    Calcium 8.8 8.5 - 10.1 mg/dL    Bilirubin Total 0.9 0.2 - 1.3 mg/dL    Albumin 3.9 3.4 - 5.0 g/dL    Protein Total 7.3 6.8 - 8.8 g/dL    Alkaline Phosphatase 60 40 - 150 U/L    ALT 22 0 - 70 U/L    AST 16 0 - 45 U/L   CBC with platelets   Result Value Ref Range    WBC 9.2 4.0 - 11.0 10e9/L    RBC Count 2.68 (L) 4.4 - 5.9 10e12/L    Hemoglobin 9.3 (L) 13.3 - 17.7 g/dL    Hematocrit 29.0 (L) 40.0 - 53.0 %     (H) 78 - 100 fl    MCH 34.7 (H) 26.5 - 33.0 pg    MCHC 32.1 31.5 - 36.5 g/dL    RDW 19.7 (H) 10.0 - 15.0 %    Platelet Count 193 150 - 450 10e9/L   Lipid panel reflex to direct LDL Fasting   Result Value Ref Range    Cholesterol 87 <200 mg/dL    Triglycerides 73 <150 mg/dL    HDL Cholesterol  35 (L) >39 mg/dL    LDL Cholesterol Calculated 37 <100 mg/dL    Non HDL Cholesterol 52 <130 mg/dL   Protein Immunofixation Serum   Result Value Ref Range    Immunofixation ELP       No monoclonal protein seen on immunofixation.  Pathological significance requires clinical   correlation.       695 - 1620 mg/dL     70 - 380 mg/dL    IGM 51 (L) 60 - 265 mg/dL        ASSESSMENT/PLAN:                                                    1. Type 2 diabetes mellitus with hyperglycemia, with long-term current use of insulin (H)  With recent fasting glucose of 54 and A1c under 6%, reduce Lantus to 40 units daily and follow-up in 3 months.  To continue the current pre-meal insulin.  - insulin glargine (LANTUS SOLOSTAR) 100 UNIT/ML pen; Inject 40 Units Subcutaneous every 24 hours  Dispense: 60 mL; Refill: 1  - PAF COMPLETED    2. CKD (chronic kidney disease) stage 3, GFR 30-59 ml/min  Appears stable.  Will check PTH and vitamin D levels next visit.  - PAF COMPLETED    3. MDS (myelodysplastic syndrome) (H)  Again discussed with the patient and the likely cause that he has myelodysplastic syndrome and bone marrow disease.  He again declines the offer to see a hematologist for evaluation and consultation.  Currently hemoglobin is 9.3 and his mild symptoms of exertional shortness of breath.  May need blood transfusion sometime in the future, possibly if hemoglobin falls below 8.0 g percent.  We will continue to follow quarterly.  - PAF COMPLETED    4. Hypertension goal BP (blood pressure) < 140/80  Blood pressure adequately maintained  - PAF COMPLETED    5. Morbid obesity (H)  Long-term weight loss would be recommended  - PAF COMPLETED    6. Hyperlipidemia LDL goal <100  Lipids are excellent.  - PAF COMPLETED    7. Microalbuminuria  Some progression from CKD.  We will continue to monitor.  He is on ACE inhibitor therapy.  - PAF COMPLETED      Follow up with Provider -Follow-up in 3 months with fasting blood studies  prior to the visit.  See Patient Instructions    The patient understood the rational for the diagnosis and treatment plan. All questions were answered to best of my ability and the patient's satisfaction.    Note: Chart documentation done in part with Dragon Voice Recognition software. Although reviewed after completion, some word and grammatical errors may remain.  Please consider this when interpreting information in this chart.      Anibal Vizcaino MD  [facility]

## 2018-08-02 ENCOUNTER — OFFICE VISIT (OUTPATIENT)
Dept: FAMILY MEDICINE | Facility: CLINIC | Age: 83
End: 2018-08-02
Payer: COMMERCIAL

## 2018-08-02 VITALS
HEART RATE: 83 BPM | DIASTOLIC BLOOD PRESSURE: 58 MMHG | OXYGEN SATURATION: 96 % | SYSTOLIC BLOOD PRESSURE: 126 MMHG | TEMPERATURE: 97.1 F | WEIGHT: 243.6 LBS | BODY MASS INDEX: 36.08 KG/M2 | RESPIRATION RATE: 18 BRPM | HEIGHT: 69 IN

## 2018-08-02 DIAGNOSIS — Z79.4 TYPE 2 DIABETES MELLITUS WITH HYPERGLYCEMIA, WITH LONG-TERM CURRENT USE OF INSULIN (H): Primary | ICD-10-CM

## 2018-08-02 DIAGNOSIS — E11.65 TYPE 2 DIABETES MELLITUS WITH HYPERGLYCEMIA, WITH LONG-TERM CURRENT USE OF INSULIN (H): Primary | ICD-10-CM

## 2018-08-02 DIAGNOSIS — E66.01 MORBID OBESITY (H): ICD-10-CM

## 2018-08-02 DIAGNOSIS — R80.9 MICROALBUMINURIA: ICD-10-CM

## 2018-08-02 DIAGNOSIS — D46.9 MDS (MYELODYSPLASTIC SYNDROME) (H): ICD-10-CM

## 2018-08-02 DIAGNOSIS — I10 HYPERTENSION GOAL BP (BLOOD PRESSURE) < 140/80: ICD-10-CM

## 2018-08-02 DIAGNOSIS — N18.30 CKD (CHRONIC KIDNEY DISEASE) STAGE 3, GFR 30-59 ML/MIN (H): ICD-10-CM

## 2018-08-02 DIAGNOSIS — E78.5 HYPERLIPIDEMIA LDL GOAL <100: ICD-10-CM

## 2018-08-02 PROCEDURE — 99214 OFFICE O/P EST MOD 30 MIN: CPT | Performed by: FAMILY MEDICINE

## 2018-08-02 NOTE — PATIENT INSTRUCTIONS
These are new changes to your current plan of care based on today's visit:    Medications stopped    Medications to be started    Change dose of this medication Reduce the Lantus insulin to 40 units daily instead of 50 units daily   New treatments        Follow up appointments:    1.  FOLLOW UP WITH YOUR PRIMARY CARE PROVIDER: 3 month(s) for diabetic follow up    2. FOLLOW UP WITH SPECIALIST:     3. FOLLOW UP WITH NURSE VISIT:     4. IMAGING STUDIES TO BE SCHEDULED:     5. PROCEDURES TO BE SCHEDULED:     6. LABS TO BE COMPLETED PRIOR TO NEXT VISIT: Fasting blood work prior to the clinic visit.    Anibal Vizcaino MD

## 2018-08-02 NOTE — MR AVS SNAPSHOT
After Visit Summary   8/2/2018    Alex Armenta    MRN: [id number]           Patient Information     Date Of Birth          1935        Visit Information        Provider Department      8/2/2018 9:40 AM Anibal Vizcaino MD [facility] [place] [facility]        Today's Diagnoses     Type 2 diabetes mellitus with hyperglycemia, with long-term current use of insulin (H)    -  1    CKD (chronic kidney disease) stage 3, GFR 30-59 ml/min        MDS (myelodysplastic syndrome) (H)        Hypertension goal BP (blood pressure) < 140/80        Morbid obesity (H)        Hyperlipidemia LDL goal <100        Microalbuminuria          Care Instructions    These are new changes to your current plan of care based on today's visit:    Medications stopped    Medications to be started    Change dose of this medication Reduce the Lantus insulin to 40 units daily instead of 50 units daily   New treatments        Follow up appointments:    1.  FOLLOW UP WITH YOUR PRIMARY CARE PROVIDER: 3 month(s) for diabetic follow up    2. FOLLOW UP WITH SPECIALIST:     3. FOLLOW UP WITH NURSE VISIT:     4. IMAGING STUDIES TO BE SCHEDULED:     5. PROCEDURES TO BE SCHEDULED:     6. LABS TO BE COMPLETED PRIOR TO NEXT VISIT: Fasting blood work prior to the clinic visit.    Anibal Vizcaino MD                Follow-ups after your visit        Follow-up notes from your care team     Return in about 3 months (around 11/2/2018) for Routine Visit, Lab Work.      Who to contact     If you have questions or need follow up information about today's clinic visit or your schedule please contact [facility] directly at [phone].  Normal or non-critical lab and imaging results will be communicated to you by MyChart, letter or phone within 4 business days after the clinic has received the results. If you do not hear from us within 7 days, please contact the clinic through MyChart or phone. If you have a critical or abnormal lab result, we  "will notify you by phone as soon as possible.  Submit refill requests through [employer] or call your pharmacy and they will forward the refill request to us. Please allow 3 business days for your refill to be completed.          Additional Information About Your Visit        Care EveryWhere ID     This is your Care EveryWhere ID. This could be used by other organizations to access your [facility] medical records  [id number]        Your Vitals Were     Pulse Temperature Respirations Height Pulse Oximetry BMI (Body Mass Index)    83 97.1  F (36.2  C) (Temporal) 18 5' 8.5\" (1.74 m) 96% 36.5 kg/m2       Blood Pressure from Last 3 Encounters:   08/02/18 126/58   04/12/18 130/50   12/01/17 122/56    Weight from Last 3 Encounters:   08/02/18 243 lb 9.6 oz (110.5 kg)   04/12/18 246 lb 14.4 oz (112 kg)   12/01/17 250 lb 14.4 oz (113.8 kg)              We Performed the Following     PAF COMPLETED          Today's Medication Changes          These changes are accurate as of 8/2/18 10:26 AM.  If you have any questions, ask your nurse or doctor.               These medicines have changed or have updated prescriptions.        Dose/Directions    LANTUS SOLOSTAR 100 UNIT/ML injection   This may have changed:  See the new instructions.   Used for:  Type 2 diabetes mellitus with hyperglycemia, with long-term current use of insulin (H)   Generic drug:  insulin glargine   Changed by:  Anibal Vizcaino MD        Dose:  40 Units   Inject 40 Units Subcutaneous every 24 hours   Quantity:  60 mL   Refills:  1                Primary Care Provider Office Phone # Fax #    Anibal Vizcaino [phone] [address] [facility] [address]        Equal Access to Services     [facility]: Hadii jose Khanna, waaxda luqadaha, qaybta kaalmada delvinda, giorgio salazar. So [facility] [phone].    ATENCIÓN: Si habla español, tiene a moreno disposición servicios gratuitos de asistencia " lingüísticaIgnacio Gavin al [phone].    We comply with applicable federal civil rights laws and [place] laws. We do not discriminate on the basis of race, color, national origin, age, disability, sex, sexual orientation, or gender identity.            Thank you!     Thank you for choosing [facility]  for your care. Our goal is always to provide you with excellent care. Hearing back from our patients is one way we can continue to improve our services. Please take a few minutes to complete the written survey that you may receive in the mail after your visit with us. Thank you!             Your Updated Medication List - Protect others around you: Learn how to safely use, store and throw away your medicines at www.disposemymeds.org.          This list is accurate as of 8/2/18 10:26 AM.  Always use your most recent med list.                   Brand Name Dispense Instructions for use Diagnosis    aspirin 81 MG tablet     100 tablet    Take 1 tablet by mouth daily.        * blood glucose monitoring test strip    no brand specified     Use 3 times a day to test blood sugars.        * ONETOUCH ULTRA test strip   Generic drug:  blood glucose monitoring     100 each    use 1 strip to test 4 times daily    Type 2 diabetes mellitus with diabetic nephropathy, without long-term current use of insulin (H)       BLOOD GLUCOSE TEST STRIPS STRP     100 Strip    One Touch Ultra-test 3 times daily (on insulin)    Diabetes mellitus, type 2 (H)       enalapril 20 MG tablet    VASOTEC    90 tablet    TAKE 1 TABLET BY MOUTH ONCE DAILY    Hypertension goal BP (blood pressure) < 140/80       HumaLOG KWIKpen 100 UNIT/ML injection   Generic drug:  insulin lispro     15 mL    INJECT SUB-Q 15 UNITS BEFORE BREAKFAST, 15 UNITS BEFORE LUNCH, AND 15 UNITS BEFORE DINNER    Type 2 diabetes mellitus with hyperglycemia, with long-term current use of insulin (H)       * insulin pen needle 30G X 8 MM    NOVOFINE    400 each    1 Units 4 times  daily        * insulin pen needle 30G X 8 MM    NOVOFINE    400 each    Use 4 daily or as directed.    Type 2 diabetes mellitus with hyperglycemia, with long-term current use of insulin (H)       LANTUS SOLOSTAR 100 UNIT/ML injection   Generic drug:  insulin glargine     60 mL    Inject 40 Units Subcutaneous every 24 hours    Type 2 diabetes mellitus with hyperglycemia, with long-term current use of insulin (H)       metFORMIN 750 MG 24 hr tablet    GLUCOPHAGE-XR    180 tablet    TAKE 1 TABLET (750 MG) BY MOUTH 2 TIMES DAILY (WITH MEALS)    Type 2 diabetes mellitus with hyperglycemia, with long-term current use of insulin (H)       Multi-vitamin Tabs tablet      Take 1 tablet by mouth daily        order for DME     300 each    Equipment being ordered: Lancets of choice for diabetic testing    Type 2 diabetes, HbA1C goal < 8% (H)       order for DME     1 Units    Equipment being ordered:   New home glucometer of choice Test strips for new glucometer for three times daily monitoring--on insulin therapy  Anibal Vizcaino MD    Type 2 diabetes, HbA1C goal < 8% (H)       order for DME     400 strip    Equipment being ordered: One Touch Ultra Glucometer Test Strips ( on insulin therapy )  To be used four times daily for glucose monitoring.    Diabetes mellitus, type 2 (H)       order for DME     300 Units    Equipment being ordered: DELICA  Lancets--uses three time daily    Type 2 diabetes, HbA1C goal < 8% (H)       pravastatin 10 MG tablet    PRAVACHOL    90 tablet    TAKE 1 TABLET (10 MG) BY MOUTH DAILY    Hyperlipidemia LDL goal <100       * Notice:  This list has 4 medication(s) that are the same as other medications prescribed for you. Read the directions carefully, and ask your doctor or other care provider to review them with you.

## 2018-08-26 DIAGNOSIS — E11.65 TYPE 2 DIABETES MELLITUS WITH HYPERGLYCEMIA, WITH LONG-TERM CURRENT USE OF INSULIN (H): ICD-10-CM

## 2018-08-26 DIAGNOSIS — Z79.4 TYPE 2 DIABETES MELLITUS WITH HYPERGLYCEMIA, WITH LONG-TERM CURRENT USE OF INSULIN (H): ICD-10-CM

## 2018-08-27 RX ORDER — INSULIN LISPRO 100 [IU]/ML
INJECTION, SOLUTION INTRAVENOUS; SUBCUTANEOUS
Qty: 15 ML | Refills: 2 | Status: SHIPPED | OUTPATIENT
Start: 2018-08-27 | End: 2019-01-17

## 2018-08-27 NOTE — TELEPHONE ENCOUNTER
Humalog:  Prescription approved per [facility] Refill Protocol.    Daria Perez, RN, BSN

## 2018-10-18 DIAGNOSIS — E11.65 TYPE 2 DIABETES MELLITUS WITH HYPERGLYCEMIA, WITH LONG-TERM CURRENT USE OF INSULIN (H): ICD-10-CM

## 2018-10-18 DIAGNOSIS — Z79.4 TYPE 2 DIABETES MELLITUS WITH HYPERGLYCEMIA, WITH LONG-TERM CURRENT USE OF INSULIN (H): ICD-10-CM

## 2018-10-19 RX ORDER — METFORMIN HYDROCHLORIDE 750 MG/1
TABLET, EXTENDED RELEASE ORAL
Qty: 180 TABLET | Refills: 1 | Status: SHIPPED | OUTPATIENT
Start: 2018-10-19

## 2018-10-19 NOTE — TELEPHONE ENCOUNTER
"Requested Prescriptions   Pending Prescriptions Disp Refills     metFORMIN (GLUCOPHAGE-XR) 750 MG 24 hr tablet [Pharmacy Med Name: MetFORMIN HCl ER Oral Tablet Extended Release 24 Hour 750 MG] 180 tablet 1     Sig: TAKE 1 TABLET (750 MG) BY MOUTH 2 TIMES DAILY (WITH MEALS)    Biguanide Agents Failed    10/18/2018 11:50 AM       Failed - Patient's CR is NOT>1.4 OR Patient's EGFR is NOT<45 within past 12 mos.    Recent Labs   Lab Test  07/27/18   1320   GFRESTIMATED  46*   GFRESTBLACK  55*     Recent Labs   Lab Test  07/27/18   1320   CR  1.47*          Passed - Blood pressure less than 140/90 in past 6 months    BP Readings from Last 3 Encounters:   08/02/18 126/58   04/12/18 130/50   12/01/17 122/56          Passed - Patient has documented LDL within the past 12 mos.    Recent Labs   Lab Test  07/27/18   1320   LDL  37          Passed - Patient has had a Microalbumin in the past 15 mos.    Recent Labs   Lab Test  07/27/18   1319   MICROL  232   UMALCR  198.29*          Passed - Patient is age 10 or older       Passed - Patient has documented A1c within the specified period of time.    If HgbA1C is 8 or greater, it needs to be on file within the past 3 months.  If less than 8, must be on file within the past 6 months.     Recent Labs   Lab Test  07/27/18   1320   A1C  5.8*          Passed - Patient does NOT have a diagnosis of CHF.       Passed - Recent (6 mo) or future (30 days) visit within the authorizing provider's specialty    Patient had office visit in the last 6 months or has a visit in the next 30 days with authorizing provider or within the authorizing provider's specialty.  See \"Patient Info\" tab in inbasket, or \"Choose Columns\" in Meds & Orders section of the refill encounter.            Last OV: 08/02/2018    Routing refill request to provider for review/approval because:  Labs out of range:  Creatine greater than 1.4    Erasto Lopez RN, BSN        "

## 2018-10-31 DIAGNOSIS — Z79.4 TYPE 2 DIABETES MELLITUS WITH HYPERGLYCEMIA, WITH LONG-TERM CURRENT USE OF INSULIN (H): ICD-10-CM

## 2018-10-31 DIAGNOSIS — N18.30 CKD (CHRONIC KIDNEY DISEASE) STAGE 3, GFR 30-59 ML/MIN (H): ICD-10-CM

## 2018-10-31 DIAGNOSIS — E78.5 HYPERLIPIDEMIA LDL GOAL <100: ICD-10-CM

## 2018-10-31 DIAGNOSIS — E11.65 TYPE 2 DIABETES MELLITUS WITH HYPERGLYCEMIA, WITH LONG-TERM CURRENT USE OF INSULIN (H): ICD-10-CM

## 2018-10-31 DIAGNOSIS — R80.9 MICROALBUMINURIA: ICD-10-CM

## 2018-10-31 DIAGNOSIS — I10 HYPERTENSION GOAL BP (BLOOD PRESSURE) < 140/80: ICD-10-CM

## 2018-10-31 DIAGNOSIS — D46.9 MDS (MYELODYSPLASTIC SYNDROME) (H): ICD-10-CM

## 2018-10-31 LAB
ALBUMIN SERPL-MCNC: 4.1 G/DL (ref 3.4–5)
ALP SERPL-CCNC: 73 U/L (ref 40–150)
ALT SERPL W P-5'-P-CCNC: 21 U/L (ref 0–70)
ANION GAP SERPL CALCULATED.3IONS-SCNC: 6 MMOL/L (ref 3–14)
AST SERPL W P-5'-P-CCNC: 15 U/L (ref 0–45)
BILIRUB SERPL-MCNC: 0.8 MG/DL (ref 0.2–1.3)
BUN SERPL-MCNC: 31 MG/DL (ref 7–30)
CALCIUM SERPL-MCNC: 8.8 MG/DL (ref 8.5–10.1)
CHLORIDE SERPL-SCNC: 108 MMOL/L (ref 94–109)
CHOLEST SERPL-MCNC: 115 MG/DL
CO2 SERPL-SCNC: 27 MMOL/L (ref 20–32)
CREAT SERPL-MCNC: 1.43 MG/DL (ref 0.66–1.25)
CREAT UR-MCNC: 126 MG/DL
ERYTHROCYTE [DISTWIDTH] IN BLOOD BY AUTOMATED COUNT: 18.6 % (ref 10–15)
GFR SERPL CREATININE-BSD FRML MDRD: 47 ML/MIN/1.7M2
GLUCOSE SERPL-MCNC: 195 MG/DL (ref 70–99)
HBA1C MFR BLD: 6.3 % (ref 0–5.6)
HCT VFR BLD AUTO: 32.1 % (ref 40–53)
HDLC SERPL-MCNC: 42 MG/DL
HGB BLD-MCNC: 10.3 G/DL (ref 13.3–17.7)
LDLC SERPL CALC-MCNC: 54 MG/DL
MCH RBC QN AUTO: 33.8 PG (ref 26.5–33)
MCHC RBC AUTO-ENTMCNC: 32.1 G/DL (ref 31.5–36.5)
MCV RBC AUTO: 105 FL (ref 78–100)
MICROALBUMIN UR-MCNC: 125 MG/L
MICROALBUMIN/CREAT UR: 99.21 MG/G CR (ref 0–17)
NONHDLC SERPL-MCNC: 73 MG/DL
PLATELET # BLD AUTO: 227 10E9/L (ref 150–450)
POTASSIUM SERPL-SCNC: 4.5 MMOL/L (ref 3.4–5.3)
PROT SERPL-MCNC: 7.6 G/DL (ref 6.8–8.8)
PTH-INTACT SERPL-MCNC: 64 PG/ML (ref 18–80)
RBC # BLD AUTO: 3.05 10E12/L (ref 4.4–5.9)
SODIUM SERPL-SCNC: 141 MMOL/L (ref 133–144)
TRIGL SERPL-MCNC: 95 MG/DL
TSH SERPL DL<=0.005 MIU/L-ACNC: 2.51 MU/L (ref 0.4–4)
WBC # BLD AUTO: 10.2 10E9/L (ref 4–11)

## 2018-10-31 PROCEDURE — 36415 COLL VENOUS BLD VENIPUNCTURE: CPT | Performed by: FAMILY MEDICINE

## 2018-10-31 PROCEDURE — 83036 HEMOGLOBIN GLYCOSYLATED A1C: CPT | Performed by: FAMILY MEDICINE

## 2018-10-31 PROCEDURE — 84443 ASSAY THYROID STIM HORMONE: CPT | Performed by: FAMILY MEDICINE

## 2018-10-31 PROCEDURE — 80061 LIPID PANEL: CPT | Performed by: FAMILY MEDICINE

## 2018-10-31 PROCEDURE — 80053 COMPREHEN METABOLIC PANEL: CPT | Performed by: FAMILY MEDICINE

## 2018-10-31 PROCEDURE — 82306 VITAMIN D 25 HYDROXY: CPT | Performed by: FAMILY MEDICINE

## 2018-10-31 PROCEDURE — 85027 COMPLETE CBC AUTOMATED: CPT | Performed by: FAMILY MEDICINE

## 2018-10-31 PROCEDURE — 82043 UR ALBUMIN QUANTITATIVE: CPT | Performed by: FAMILY MEDICINE

## 2018-10-31 PROCEDURE — 83970 ASSAY OF PARATHORMONE: CPT | Performed by: FAMILY MEDICINE

## 2018-11-01 LAB — DEPRECATED CALCIDIOL+CALCIFEROL SERPL-MC: 17 UG/L (ref 20–75)

## 2018-11-01 NOTE — PROGRESS NOTES
SUBJECTIVE:   Alex Armenta is a 83 year old male who presents to clinic today for the following health issues:      HPI  Diabetes Follow-up--appears to be doing well with his multiple insulin injections.  At times he has had a low blood sugar reading with weakness and diaphoresis.  They are infrequent and likely are related to decreased oral intake along with his insulin.  He appear to be easily corrected although one episode was significant and that his blood glucose was in the 30s.  He was able to correct this with suite and soda.  Currently no other symptoms noted.  No new neuropathic changes per      Patient is checking blood sugars: ranges  100 - 200      Diabetic concerns: None     Symptoms of hypoglycemia (low blood sugar): Weakness and diaphoresis     Paresthesias (numbness or burning in feet) or sores: Yes       Date of last diabetic eye exam: Patient is due and aware.     Diabetes Management Resources    Hyperlipidemia Follow-Up      Rate your low fat/cholesterol diet?: good    Taking statin?  Yes, no muscle aches from statin    Other lipid medications/supplements?:  none    Hypertension Follow-up      Outpatient blood pressures are not being checked.    Low Salt Diet: not monitoring salt    BP Readings from Last 2 Encounters:   11/06/18 134/50   08/02/18 126/58     Hemoglobin A1C (%)   Date Value   10/31/2018 6.3 (H)   07/27/2018 5.8 (H)     LDL Cholesterol Calculated (mg/dL)   Date Value   10/31/2018 54   07/27/2018 37     Problem list and histories reviewed & adjusted, as indicated.  Additional history: as documented      Chronic Kidney Disease Follow-up      Current NSAID use?  No            Recent Labs   Lab Test  10/31/18   1154  07/27/18   1320  04/12/18   1123   08/04/17   1019   A1C  6.3*  5.8*  6.2   < >  6.3*   LDL  54  37  42   < >  43   HDL  42  35*  40   < >  34*   TRIG  95  73  94   < >  72   ALT  21  22  16   < >  21   CR  1.43*  1.47*  1.53*   < >  1.40*   GFRESTIMATED  47*  46*  44*   <  >  49*   GFRESTBLACK  57*  55*  53*   < >  59*   POTASSIUM  4.5  5.0  4.8   < >  4.9   TSH  2.51   --    --    --   2.14    < > = values in this interval not displayed.      BP Readings from Last 3 Encounters:   11/06/18 134/50   08/02/18 126/58   04/12/18 130/50    Wt Readings from Last 3 Encounters:   11/06/18 247 lb (112 kg)   08/02/18 243 lb 9.6 oz (110.5 kg)   04/12/18 246 lb 14.4 oz (112 kg)                    ROS:  CONSTITUTIONAL: NEGATIVE for fever, chills, change in weight  INTEGUMENTARY/SKIN: NEGATIVE for worrisome rashes, moles or lesions  EYES: NEGATIVE for vision changes or irritation  ENT/MOUTH: NEGATIVE for ear, mouth and throat problems  RESP: NEGATIVE for significant cough or SOB  BREAST: NEGATIVE for masses, tenderness or discharge  CV: Currently not having any chest pain.  He does feel overall fatigued at times and is now noted to have a significant bradycardia.  Prior to today's visit, also rates are 68-83 over the last year.  Not have any shortness of breath, orthopnea or PND.  He is aware that his pulse rate at times feels slow.  He has not had any prior history of MIs or heart failure.  He does have a heart murmur, systolic.  His last echocardiogram was in 2012.b at that time he had normal left ventricular size and function with an ejection fraction of 55-60%.  He had mild diastolic dysfunction.  Normal right sided heart function.  Despite having a systolic murmur, he had no significant valvular abnormalities.  He currently is not had any near syncope episodes and no syncopal episodes.  Operatory status is stable.  GI: NEGATIVE for nausea, abdominal pain, heartburn, or change in bowel habits  : NEGATIVE for frequency, dysuria, or hematuria  MUSCULOSKELETAL: NEGATIVE for significant arthralgias or myalgia  NEURO: NEGATIVE for weakness, dizziness or paresthesias  ENDOCRINE: Ongoing care for type 2 insulin-dependent diabetes.  HEME: NEGATIVE for bleeding problems  HEME/ALLERGY/IMMUNE: Has not  "been diagnosed but it appears that he has myelodysplastic type syndrome.  He has had persistent findings of macrocytosis along with anemia but with normal white blood cell count and platelet count.  His hemoglobin varies from 9.3-10.3 g%.  He has not received any blood transfusions.  In the past iron and B12 levels have been checked and are normal.  He is also been evaluated for myeloma and immunoelectrophoresis was normal.  PSYCHIATRIC: NEGATIVE for changes in mood or affect    OBJECTIVE:                                                    /50  Pulse (!) 45  Temp 96.7  F (35.9  C) (Temporal)  Resp 18  Ht 5' 8.5\" (1.74 m)  Wt 247 lb (112 kg)  SpO2 99%  BMI 37.01 kg/m2  Body mass index is 37.01 kg/(m^2).  GENERAL: alert, no distress, cooperative, pale and over weight  EYES: Eyes grossly normal to inspection, extraocular movements - intact, and PERRL  HENT: ear canals- normal; TMs- normal; Nose- normal; Mouth- no ulcers, no lesions  NECK: no tenderness, no adenopathy, no asymmetry, no masses, no stiffness; thyroid- normal to palpation  NECK: No carotid bruits noted  RESP: lungs clear to auscultation - no rales, no rhonchi, no wheezes  BREAST: no masses, no tenderness, no nipple discharge, no palpable axillary masses or adenopathy  CV: normal S1 S2, no S3 or S4, no bruits heard, grade 2/6 systolic murmur heard best over the left sternal border and base of the heart without major radiation and bradycardia with some irregularity and premature beats.  ABDOMEN: soft, no tenderness, no  hepatosplenomegaly, no masses, normal bowel sounds  MS: extremities- no gross deformities noted, no edema  SKIN: no suspicious lesions, no rashes  NEURO: strength and tone- normal, sensory exam- grossly normal, mentation- intact, speech- normal, reflexes- symmetric  Diabetic foot exam: normal DP and PT pulses, no trophic changes or ulcerative lesions and normal sensory exam  BACK: no CVA tenderness, no paralumbar " tenderness  PSYCH: Alert and oriented times 3; speech- coherent , normal rate and volume; able to articulate logical thoughts, able to abstract reason, no tangential thoughts, no hallucinations or delusions, affect- normal  LYMPHATICS: ant. cervical- normal, post. cervical- normal, axillary- normal, supraclavicular- normal, inguinal- normal    Diagnostic test results:  Diagnostic Test Results:  Results for orders placed or performed in visit on 11/06/18 (from the past 24 hour(s))   EKG 12-lead complete w/read - Clinics    Impression    Bradycardia.  In the rhythm strip, P waves are identified but appear to be dissociated from the ventricular complexes.  For the most part ventricular complexes are uniform with occasional PVC noted.  No acute ST-T wave changes noted.  Right bundle branch block appears present.  Cannot rule out an old anterior infarct with Q waves in V2, V3.    Impression: A-V dissociation, may be a jessica rhythm.  Does not appear to be a Mobitz type pattern.  No acute changes otherwise noted.  Right bundle branch block.        Lab Results   Component Value Date    A1C 6.3 10/31/2018    A1C 5.8 07/27/2018    A1C 6.2 04/12/2018    A1C 6.2 12/01/2017    A1C 6.3 08/04/2017     CBC RESULTS:   Recent Labs   Lab Test  10/31/18   1154   WBC  10.2   RBC  3.05*   HGB  10.3*   HCT  32.1*   MCV  105*   MCH  33.8*   MCHC  32.1   RDW  18.6*   PLT  227     Last Comprehensive Metabolic Panel:  Sodium   Date Value Ref Range Status   10/31/2018 141 133 - 144 mmol/L Final     Potassium   Date Value Ref Range Status   10/31/2018 4.5 3.4 - 5.3 mmol/L Final     Chloride   Date Value Ref Range Status   10/31/2018 108 94 - 109 mmol/L Final     Carbon Dioxide   Date Value Ref Range Status   10/31/2018 27 20 - 32 mmol/L Final     Anion Gap   Date Value Ref Range Status   10/31/2018 6 3 - 14 mmol/L Final     Glucose   Date Value Ref Range Status   10/31/2018 195 (H) 70 - 99 mg/dL Final     Comment:     Non Fasting     Urea  Nitrogen   Date Value Ref Range Status   10/31/2018 31 (H) 7 - 30 mg/dL Final     Creatinine   Date Value Ref Range Status   10/31/2018 1.43 (H) 0.66 - 1.25 mg/dL Final     GFR Estimate   Date Value Ref Range Status   10/31/2018 47 (L) >60 mL/min/1.7m2 Final     Comment:     Non  GFR Calc     Calcium   Date Value Ref Range Status   10/31/2018 8.8 8.5 - 10.1 mg/dL Final     Cholesterol   Date Value Ref Range Status   10/31/2018 115 <200 mg/dL Final   07/27/2018 87 <200 mg/dL Final     HDL Cholesterol   Date Value Ref Range Status   10/31/2018 42 >39 mg/dL Final   07/27/2018 35 (L) >39 mg/dL Final     LDL Cholesterol Calculated   Date Value Ref Range Status   10/31/2018 54 <100 mg/dL Final     Comment:     Desirable:       <100 mg/dl   07/27/2018 37 <100 mg/dL Final     Comment:     Desirable:       <100 mg/dl     Triglycerides   Date Value Ref Range Status   10/31/2018 95 <150 mg/dL Final     Comment:     Non Fasting   07/27/2018 73 <150 mg/dL Final     Cholesterol/HDL Ratio   Date Value Ref Range Status   08/11/2015 3.0 0.0 - 5.0 Final   01/08/2015 2.5 0.0 - 5.0 Final       ASSESSMENT/PLAN:                                                    1. Type 2 diabetes mellitus with hyperglycemia, with long-term current use of insulin (H)  A1c adequately controlled at this time although may be artificially low due to the anemia.  Continue current pattern of insulin use and follow-up in 3 months.  Discussed the hypoglycemic reactions and encouraged regular food intake, nighttime snack and close monitoring.    2. CKD (chronic kidney disease) stage 3, GFR 30-59 ml/min (H)  Microalbumin stable.  Renal status appears stable.  GFR approximately 47.    3. Hypertension goal BP (blood pressure) < 140/80  Blood pressure adequately controlled on current medications    4. AV dissociation  This may be a new finding since he is not been bradycardic up until this visit.  EKG shows a right bundle branch block and what appears  to be AV disassociation and possible jessica rhythm.  Has significant bradycardia and will be referred for cardiology consultation and possible pacemaker consideration.  Defer further study and decisions to the cardiology staff.    5. Bradycardia  Referred for cardiology evaluation in the next 2 weeks.  Patient is instructed to seek ER assistance of current status changes per  - EKG 12-lead complete w/read - Clinics  - CARDIOLOGY EVAL ADULT REFERRAL    6. MDS (myelodysplastic syndrome) (H)  This is been a presumed diagnosis based on the macrocytosis, anemia and persistent nature without findings of multiple myeloma in the past.  Patient has been reluctant to seek consultation with hematology and undergo bone marrow biopsy.  Currently is stable and will continue to monitor and offer hematology consults.    7. Morbid obesity (H)  Long-term weight loss would be recommended.    8. Hyperlipidemia LDL goal <100  LDL has been at goal.    9. Microalbuminuria  Persisting microalbuminuria, somewhat improved in the last 3 months.    10. Vitamin D deficiency  New finding for this patient.  Will treat with 50,000 units of vitamin D weekly for the next 12 weeks and recheck levels and then possibly go to daily supplements.  - cholecalciferol (VITAMIN D3) 14679 units capsule; Take 1 capsule (50,000 Units) by mouth once a week  Dispense: 12 capsule; Refill: 1    11. Need for prophylactic vaccination and inoculation against influenza  Vaccination given today.    - FLU VACCINE, INCREASED ANTIGEN, PRESV FREE, AGE 65+ [03265]  - ADMIN INFLUENZA (For MEDICARE Patients ONLY) []      Appointment [facility] with [facility] in [place].  Appointment in approximately 2 weeks.  He should go to the ER if any change in current status.  Follow up with Provider -follow-up in 3 months for primary care.  See Patient Instructions    The patient understood the rational for the diagnosis and treatment plan. All questions  were answered to best of my ability and the patient's satisfaction.    Note: Chart documentation done in part with Dragon Voice Recognition software. Although reviewed after completion, some word and grammatical errors may remain.  Please consider this when interpreting information in this chart.      Anibal Vizcaino MD  [facility]

## 2018-11-06 ENCOUNTER — OFFICE VISIT (OUTPATIENT)
Dept: FAMILY MEDICINE | Facility: CLINIC | Age: 83
End: 2018-11-06
Payer: COMMERCIAL

## 2018-11-06 VITALS
TEMPERATURE: 96.7 F | OXYGEN SATURATION: 99 % | WEIGHT: 247 LBS | BODY MASS INDEX: 36.58 KG/M2 | HEIGHT: 69 IN | SYSTOLIC BLOOD PRESSURE: 134 MMHG | HEART RATE: 45 BPM | DIASTOLIC BLOOD PRESSURE: 50 MMHG | RESPIRATION RATE: 18 BRPM

## 2018-11-06 DIAGNOSIS — E66.01 MORBID OBESITY (H): ICD-10-CM

## 2018-11-06 DIAGNOSIS — R00.1 BRADYCARDIA: ICD-10-CM

## 2018-11-06 DIAGNOSIS — I45.89 AV DISSOCIATION: ICD-10-CM

## 2018-11-06 DIAGNOSIS — Z23 NEED FOR PROPHYLACTIC VACCINATION AND INOCULATION AGAINST INFLUENZA: ICD-10-CM

## 2018-11-06 DIAGNOSIS — D46.9 MDS (MYELODYSPLASTIC SYNDROME) (H): ICD-10-CM

## 2018-11-06 DIAGNOSIS — Z79.4 TYPE 2 DIABETES MELLITUS WITH HYPERGLYCEMIA, WITH LONG-TERM CURRENT USE OF INSULIN (H): Primary | ICD-10-CM

## 2018-11-06 DIAGNOSIS — N18.30 CKD (CHRONIC KIDNEY DISEASE) STAGE 3, GFR 30-59 ML/MIN (H): ICD-10-CM

## 2018-11-06 DIAGNOSIS — E55.9 VITAMIN D DEFICIENCY: ICD-10-CM

## 2018-11-06 DIAGNOSIS — E11.65 TYPE 2 DIABETES MELLITUS WITH HYPERGLYCEMIA, WITH LONG-TERM CURRENT USE OF INSULIN (H): Primary | ICD-10-CM

## 2018-11-06 DIAGNOSIS — I10 HYPERTENSION GOAL BP (BLOOD PRESSURE) < 140/80: ICD-10-CM

## 2018-11-06 DIAGNOSIS — E78.5 HYPERLIPIDEMIA LDL GOAL <100: ICD-10-CM

## 2018-11-06 DIAGNOSIS — R80.9 MICROALBUMINURIA: ICD-10-CM

## 2018-11-06 PROCEDURE — 90662 IIV NO PRSV INCREASED AG IM: CPT | Performed by: FAMILY MEDICINE

## 2018-11-06 PROCEDURE — 93000 ELECTROCARDIOGRAM COMPLETE: CPT | Performed by: FAMILY MEDICINE

## 2018-11-06 PROCEDURE — G0008 ADMIN INFLUENZA VIRUS VAC: HCPCS | Performed by: FAMILY MEDICINE

## 2018-11-06 PROCEDURE — 99214 OFFICE O/P EST MOD 30 MIN: CPT | Mod: 25 | Performed by: FAMILY MEDICINE

## 2018-11-06 ASSESSMENT — PAIN SCALES - GENERAL: PAINLEVEL: NO PAIN (0)

## 2018-11-06 NOTE — MR AVS SNAPSHOT
After Visit Summary   11/6/2018    Alex Armenta    MRN: [id number]           Patient Information     Date Of Birth          1935        Visit Information        Provider Department      11/6/2018 11:20 AM Anibal Vizcaino MD [facility]        Today's Diagnoses     Hypertension goal BP (blood pressure) < 140/80    -  1    CKD (chronic kidney disease) stage 3, GFR 30-59 ml/min (H)        MDS (myelodysplastic syndrome) (H)        Type 2 diabetes mellitus with hyperglycemia, with long-term current use of insulin (H)        Morbid obesity (H)        Hyperlipidemia LDL goal <100        Microalbuminuria        Vitamin D deficiency        Bradycardia          Care Instructions    These are new changes to your current plan of care based on today's visit:    Medications stopped    Medications to be started    Change dose of this medication    New treatments        Follow up appointments:    1.  FOLLOW UP WITH YOUR PRIMARY CARE PROVIDER: 3 month(s) for primary care follow up for diabetes and blood studies    2. FOLLOW UP WITH SPECIALIST: Cardiologist appointment in [place]    Anibal Vizcaino MD                Follow-ups after your visit        Additional Services     CARDIOLOGY EVAL ADULT REFERRAL       Preferred location:  [facility] in [place]    Please be aware that coverage of these services is subject to the terms and limitations of your health insurance plan.  Call member services at your health plan with any benefit or coverage questions.      Please bring the following to your appointment:  Any x-rays, CTs or MRIs which have been performed. Contact the facility where they were done to arrange for  prior to your scheduled appointment.    List of current medications  This referral request   Any documents/labs given to you for this referral                  Who to contact     If you have questions or need follow up information about today's clinic visit or your  "schedule please contact [facility] directly at [phone].  Normal or non-critical lab and imaging results will be communicated to you by MyChart, letter or phone within 4 business days after the clinic has received the results. If you do not hear from us within 7 days, please contact the clinic through MyChart or phone. If you have a critical or abnormal lab result, we will notify you by phone as soon as possible.  Submit refill requests through [employer] or call your pharmacy and they will forward the refill request to us. Please allow 3 business days for your refill to be completed.          Additional Information About Your Visit        [employer] Information     [employer] lets you send messages to your doctor, view your test results, renew your prescriptions, schedule appointments and more. To sign up, go to [email or web address] . Click on \"Log in\" on the left side of the screen, which will take you to the Welcome page. Then click on \"Sign up Now\" on the right side of the page.     You will be asked to enter the access code listed below, as well as some personal information. Please follow the directions to create your username and password.     Your access code is: [email or web address]  Expires: 2019 12:51 PM     Your access code will  in 90 days. If you need help or a new code, please call your [facility] clinic or [phone].        Care EveryWhere ID     This is your Care EveryWhere ID. This could be used by other organizations to access your [facility] medical records  [id number]        Your Vitals Were     Pulse Temperature Respirations Height Pulse Oximetry BMI (Body Mass Index)    45 96.7  F (35.9  C) (Temporal) 18 5' 8.5\" (1.74 m) 99% 37.01 kg/m2       Blood Pressure from Last 3 Encounters:   18 134/50   18 126/58   18 130/50    Weight from Last 3 Encounters:   18 247 lb (112 kg)   18 243 lb 9.6 oz (110.5 kg)   18 246 lb 14.4 oz (112 kg)              We " Performed the Following     CARDIOLOGY EVAL ADULT REFERRAL     EKG 12-lead complete w/read - Clinics          Today's Medication Changes          These changes are accurate as of 11/6/18  1:06 PM.  If you have any questions, ask your nurse or doctor.               Start taking these medicines.        Dose/Directions    cholecalciferol 39138 units capsule   Commonly known as:  VITAMIN D3   Used for:  Vitamin D deficiency   Started by:  Anibal Vizcaino MD        Dose:  1 capsule   Take 1 capsule (50,000 Units) by mouth once a week   Quantity:  12 capsule   Refills:  1            Where to get your medicines      These medications were sent to [id number] - [place], [place] - [address]  [address], [place] [address]    Hours:  test fax successful 4/5/04 kr Phone:  [phone]     cholecalciferol 91340 units capsule                Primary Care Provider Office Phone # Fax #    Anibal Vizcaino [phone] [address] [facility] [address]        Equal Access to Services     [facility] AH: Hadii aad ku hadasho Soomaali, waaxda luqadaha, qaybta kaalmada adeegyada, waxay idiin hayjoanan rayo cuevas . So [facility] [phone].    ATENCIÓN: Si habla español, tiene a moreno disposición servicios gratuitos de asistencia lingüística. Llame al [phone].    We comply with applicable federal civil rights laws and [place] laws. We do not discriminate on the basis of race, color, national origin, age, disability, sex, sexual orientation, or gender identity.            Thank you!     Thank you for choosing [facility]  for your care. Our goal is always to provide you with excellent care. Hearing back from our patients is one way we can continue to improve our services. Please take a few minutes to complete the written survey that you may receive in the mail after your visit with us. Thank you!             Your Updated Medication List - Protect  others around you: Learn how to safely use, store and throw away your medicines at www.disposemymeds.org.          This list is accurate as of 11/6/18  1:06 PM.  Always use your most recent med list.                   Brand Name Dispense Instructions for use Diagnosis    aspirin 81 MG tablet     100 tablet    Take 1 tablet by mouth daily.        * blood glucose monitoring test strip    no brand specified     Use 3 times a day to test blood sugars.        * ONETOUCH ULTRA test strip   Generic drug:  blood glucose monitoring     100 each    use 1 strip to test 4 times daily    Type 2 diabetes mellitus with diabetic nephropathy, without long-term current use of insulin (H)       BLOOD GLUCOSE TEST STRIPS STRP     100 Strip    One Touch Ultra-test 3 times daily (on insulin)    Diabetes mellitus, type 2 (H)       cholecalciferol 86735 units capsule    VITAMIN D3    12 capsule    Take 1 capsule (50,000 Units) by mouth once a week    Vitamin D deficiency       enalapril 20 MG tablet    VASOTEC    90 tablet    TAKE 1 TABLET BY MOUTH ONCE DAILY    Hypertension goal BP (blood pressure) < 140/80       HumaLOG KWIKpen 100 UNIT/ML injection   Generic drug:  insulin lispro     15 mL    INJECT 15 UNITS SUBCUTANEOUSLY BEFORE BREAKFAST, LUNCH AND DINNER    Type 2 diabetes mellitus with hyperglycemia, with long-term current use of insulin (H)       * insulin pen needle 30G X 8 MM    NOVOFINE    400 each    1 Units 4 times daily        * insulin pen needle 30G X 8 MM    NOVOFINE    400 each    Use 4 daily or as directed.    Type 2 diabetes mellitus with hyperglycemia, with long-term current use of insulin (H)       LANTUS SOLOSTAR 100 UNIT/ML injection   Generic drug:  insulin glargine     60 mL    Inject 40 Units Subcutaneous every 24 hours    Type 2 diabetes mellitus with hyperglycemia, with long-term current use of insulin (H)       metFORMIN 750 MG 24 hr tablet    GLUCOPHAGE-XR    180 tablet    TAKE 1 TABLET (750 MG) BY MOUTH 2  TIMES DAILY (WITH MEALS)    Type 2 diabetes mellitus with hyperglycemia, with long-term current use of insulin (H)       Multi-vitamin Tabs tablet      Take 1 tablet by mouth daily        order for DME     300 each    Equipment being ordered: Lancets of choice for diabetic testing    Type 2 diabetes, HbA1C goal < 8% (H)       order for DME     1 Units    Equipment being ordered:   New home glucometer of choice Test strips for new glucometer for three times daily monitoring--on insulin therapy  Anibal Vizcaino MD    Type 2 diabetes, HbA1C goal < 8% (H)       order for DME     400 strip    Equipment being ordered: One Touch Ultra Glucometer Test Strips ( on insulin therapy )  To be used four times daily for glucose monitoring.    Diabetes mellitus, type 2 (H)       order for DME     300 Units    Equipment being ordered: DELICA  Lancets--uses three time daily    Type 2 diabetes, HbA1C goal < 8% (H)       pravastatin 10 MG tablet    PRAVACHOL    90 tablet    TAKE 1 TABLET (10 MG) BY MOUTH DAILY    Hyperlipidemia LDL goal <100       * Notice:  This list has 4 medication(s) that are the same as other medications prescribed for you. Read the directions carefully, and ask your doctor or other care provider to review them with you.

## 2018-11-06 NOTE — PATIENT INSTRUCTIONS
These are new changes to your current plan of care based on today's visit:    Medications stopped    Medications to be started    Change dose of this medication    New treatments        Follow up appointments:    1.  FOLLOW UP WITH YOUR PRIMARY CARE PROVIDER: 3 month(s) for primary care follow up for diabetes and blood studies    2. FOLLOW UP WITH SPECIALIST: [place]

## 2018-11-06 NOTE — PROGRESS NOTES
Injectable Influenza Immunization Documentation  Prior to injection verified patient identity using patient's name and date of birth.  Due to injection administration, patient instructed to remain in clinic for 15 minutes  afterwards, and to report any adverse reaction to me immediately.      1.  Is the person to be vaccinated sick today?   No    2. Does the person to be vaccinated have an allergy to a component   of the vaccine?   No  Egg Allergy Algorithm Link    3. Has the person to be vaccinated ever had a serious reaction   to influenza vaccine in the past?   No    4. Has the person to be vaccinated ever had Guillain-Barré syndrome?   No    Form completed by   Ruddy Chapa MA

## 2018-12-03 DIAGNOSIS — E78.5 HYPERLIPIDEMIA LDL GOAL <100: ICD-10-CM

## 2018-12-05 RX ORDER — PRAVASTATIN SODIUM 10 MG
TABLET ORAL
Qty: 90 TABLET | Refills: 2 | Status: SHIPPED | OUTPATIENT
Start: 2018-12-05 | End: 2019-07-03

## 2018-12-05 NOTE — TELEPHONE ENCOUNTER
Pravastatin:  Prescription approved per [facility] Refill Protocol.    Daria Perez, RN, BSN

## 2018-12-12 DIAGNOSIS — E11.21 TYPE 2 DIABETES MELLITUS WITH DIABETIC NEPHROPATHY, WITHOUT LONG-TERM CURRENT USE OF INSULIN (H): ICD-10-CM

## 2018-12-13 NOTE — TELEPHONE ENCOUNTER
"Requested Prescriptions   Pending Prescriptions Disp Refills     ONETOUCH ULTRA test strip [Pharmacy Med Name: OneTouch Ultra Blue In Vitro Strip] 100 each 1     Sig: use 1 strip to test 4 times daily    Diabetic Supplies Protocol Passed - 12/12/2018  4:30 PM       Passed - Patient is 18 years of age or older       Passed - Recent (6 mo) or future (30 days) visit within the authorizing provider's specialty    Patient had office visit in the last 6 months or has a visit in the next 30 days with authorizing provider.  See \"Patient Info\" tab in inbasket, or \"Choose Columns\" in Meds & Orders section of the refill encounter.            Prescription approved per [facility] Refill Protocol.  Due to follow up around 02/2019    Katina Stokes, RN, BSN     "

## 2019-01-07 DIAGNOSIS — I10 HYPERTENSION GOAL BP (BLOOD PRESSURE) < 140/80: ICD-10-CM

## 2019-01-08 RX ORDER — ENALAPRIL MALEATE 20 MG/1
TABLET ORAL
Qty: 30 TABLET | Refills: 0 | Status: SHIPPED | OUTPATIENT
Start: 2019-01-08 | End: 2019-02-08

## 2019-01-08 NOTE — TELEPHONE ENCOUNTER
[employer]  Routing refill request to provider for review/approval because:  Labs out of range:  Creatinine    Daria Perez, RN, BSN

## 2019-01-11 DIAGNOSIS — I10 HYPERTENSION GOAL BP (BLOOD PRESSURE) < 140/80: ICD-10-CM

## 2019-01-11 RX ORDER — ENALAPRIL MALEATE 20 MG/1
TABLET ORAL
Qty: 90 TABLET | Refills: 0 | Status: SHIPPED | OUTPATIENT
Start: 2019-01-11 | End: 2019-05-20

## 2019-01-11 NOTE — TELEPHONE ENCOUNTER
Reason for Call:  Other prescription    Detailed comments: Patient states that he is all out of this medication and would like a refill asap     Phone Number Patient can be reached at: Home number on file [phone] (home)    Best Time: Anytime     Can we leave a detailed message on this number? YES    Call taken on 1/11/2019 at 2:44 PM by Michelle Forde

## 2019-01-17 DIAGNOSIS — Z79.4 TYPE 2 DIABETES MELLITUS WITH HYPERGLYCEMIA, WITH LONG-TERM CURRENT USE OF INSULIN (H): ICD-10-CM

## 2019-01-17 DIAGNOSIS — E11.65 TYPE 2 DIABETES MELLITUS WITH HYPERGLYCEMIA, WITH LONG-TERM CURRENT USE OF INSULIN (H): ICD-10-CM

## 2019-01-18 RX ORDER — INSULIN LISPRO 100 [IU]/ML
INJECTION, SOLUTION INTRAVENOUS; SUBCUTANEOUS
Qty: 15 ML | Refills: 0 | Status: SHIPPED | OUTPATIENT
Start: 2019-01-18 | End: 2019-03-08

## 2019-01-18 NOTE — TELEPHONE ENCOUNTER
Humalog    Medication is being filled for 1 time refill only due to:  Patient needs to be seen because Establish Care.     Next 5 appointments (look out 90 days)    Feb 14, 2019 11:40 AM CST  Office Visit with LEOBARDO Gallagher CNP  [facility] ([facility] [address], [address]  [place] [address]  [phone]        Ryann Ocampo, RN, BSN

## 2019-02-04 NOTE — PROGRESS NOTES
"  SUBJECTIVE:     HPI     Diabetes Follow-up    Patient is checking blood sugars: twice daily.    Blood sugar testing frequency justification: on insulin- goal of 4 times/day  Results are as follows:        \" It runs between 100-190, depends what I ate \"     Diabetic concerns: other - \"i always have concerns\"      Symptoms of hypoglycemia (low blood sugar): Symptoms occur if sugars < 60- none noted since last visit.     Paresthesias (numbness or burning in feet) or sores: Yes -\"starting in the last 6 months, neuropathy\"      Date of last diabetic eye exam: 7 months ago at [facility] - reported normal eye exam     BP Readings from Last 2 Encounters:   02/14/19 160/60   11/06/18 134/50     Hemoglobin A1C (%)   Date Value   02/14/2019 6.3 (H)   10/31/2018 6.3 (H)     LDL Cholesterol Calculated (mg/dL)   Date Value   10/31/2018 54   07/27/2018 37     Diabetes Management Resources  Hyperlipidemia Follow-Up      Rate your low fat/cholesterol diet?: good    Taking statin?  Yes, possible muscle aches from statin-    Other lipid medications/supplements?:  none    Hypertension Follow-up      Outpatient blood pressures are not being checked.    Low Salt Diet: not monitoring salt    Chronic Kidney Disease Follow-up      Current NSAID use?  No    Alex Armenta is a 84 year old male who presents to clinic today for the following health issues:  He does not want to be seen at the cardiology, he thinks he has \"enough problems\" going on which is why he would like to remain with the medications he is currently taking.  He lives by himself and drives, reports that he is a good . If Nelson is not signing the motor vehicle agreement, he will have to change clinics instead of seeing cardiology.  He mentions that having an insert put in costs too much and he would rather leave the money to his heir. A PA he had been seen diagnosed with skin cancer.   Patient mentions that his blood pressure is elevated today, patient is " wondering if medication is working as it should. He does not think he could live for another 5 years. His brother passed away at 88 who was much healthier than Aaron.   His blood sugar sometimes goes down to 60 when he did not eat for 8-9 hours. He is eating regular meals, his kids cook food for him. He is agreeable to an updated blood work. Patient would really like to be able to get his drivers license renewed. He agrees that his heart rate is low, but it has not stopped him   Patient is very frustrated that he will not be able to drive any longer. He does not think he will be able to come back to [place].   He thinks he might have a blood disease, because his blood cells were abnormal last time he was seen by Dr. Vizcaino.   Patient thinks that his only options are to get a pacemaker or not be able to drive.     Sleep   He usually gets 3-4 hours of sleep after which he uses the bathroom and has a hard time falling back asleep.   Denies dizziness and chest pain. Positive for shortness of breath.     Social History  He has 9 children who all live in [place]. His kids help him with house chores. Patient does not want to disclose his health issues to his kids.    Problem list and histories reviewed & adjusted, as indicated.  Additional history: as documented    Patient Active Problem List   Diagnosis     Other and unspecified malignant neoplasm of skin of other and unspecified parts of face     Arrhythmia     Hyperlipidemia LDL goal <100     Macrocytic anemia     Abnormal serum protein electrophoresis     Advanced directives, counseling/discussion     Health Care Home     Hypertension goal BP (blood pressure) < 140/80     CKD (chronic kidney disease) stage 3, GFR 30-59 ml/min (H)     MDS (myelodysplastic syndrome) (H)     Type 2 diabetes mellitus with hyperglycemia, with long-term current use of insulin (H)     Morbid obesity (H)     Microalbuminuria     Vitamin D deficiency     Past Surgical History:   Procedure  Laterality Date     C SKIN ALLOGRFT FACE/NCK/HND/FT/GEN <100SQCM      basal cell carinoma     EXC SKIN MALIG 0.5CM OR LESS,FACIAL       HC REMOVE TONSILS/ADENOIDS,<11 Y/O      at age 10     HC REMV CATARACT EXTRACAP,INSERT LENS         Social History     Tobacco Use     Smoking status: Former Smoker     Packs/day: 0.50     Years: 25.00     Pack years: 12.50     Last attempt to quit: 1979     Years since quittin.6     Smokeless tobacco: Never Used   Substance Use Topics     Alcohol use: No     Frequency: Never     Comment: OCCASIONAL BEER AND OR 1 GLASS OF WINE ONCE PER MONTH     Family History   Problem Relation Age of Onset     Heart Disease Mother      Family History Negative No family hx of      Asthma No family hx of      Diabetes No family hx of      C.A.D. No family hx of      Hypertension No family hx of      Cerebrovascular Disease No family hx of      Breast Cancer No family hx of      Cancer - colorectal No family hx of      Alcohol/Drug No family hx of      Allergies No family hx of      Anesthesia Reaction No family hx of      Arthritis No family hx of      Blood Disease No family hx of      Cardiovascular No family hx of      Colon Cancer No family hx of      Other Cancer No family hx of      Depression No family hx of      Anxiety Disorder No family hx of      Osteoporosis No family hx of      Genetic Disorder No family hx of      Thyroid Disease No family hx of      Obesity No family hx of      Substance Abuse No family hx of      Mental Illness No family hx of      Prostate Cancer No family hx of      Hyperlipidemia No family hx of      Coronary Artery Disease No family hx of          Current Outpatient Medications   Medication Sig Dispense Refill     blood glucose monitoring (NO BRAND SPECIFIED) test strip Use 3 times a day to test blood sugars.        BLOOD GLUCOSE TEST STRIPS STRP One Touch Ultra-test 3 times daily (on insulin) 100 Strip prn     cholecalciferol (VITAMIN D3) 04651  units capsule Take 1 capsule (50,000 Units) by mouth once a week 12 capsule 1     enalapril (VASOTEC) 20 MG tablet TAKE 1 TABLET BY MOUTH ONCE DAILY 30 tablet 0     enalapril (VASOTEC) 20 MG tablet TAKE 1 TABLET BY MOUTH ONCE DAILY 90 tablet 0     HUMALOG KWIKPEN 100 UNIT/ML soln INJECT 15 UNITS SUBCUTANEOUSLY BEFORE BREAKFAST, LUNCH AND DINNER 15 mL 0     insulin glargine (LANTUS SOLOSTAR) 100 UNIT/ML pen Inject 45 Units Subcutaneous every 24 hours  60 mL 1     insulin pen needle (NOVOFINE) 30G X 8 MM 1 Units 4 times daily 400 each 3     insulin pen needle (NOVOFINE) 30G X 8 MM Use 4 daily or as directed. 400 each 3     melatonin 1 MG TABS tablet Take 1 mg by mouth nightly as needed for sleep       metFORMIN (GLUCOPHAGE-XR) 750 MG 24 hr tablet TAKE 1 TABLET (750 MG) BY MOUTH 2 TIMES DAILY (WITH MEALS)  180 tablet 1     ONETOUCH ULTRA test strip use 1 strip to test 4 times daily 100 each 1     ORDER FOR DME Equipment being ordered: DELICA  Lancets--uses three time daily 300 Units 4     ORDER FOR DME Equipment being ordered: One Touch Ultra Glucometer Test Strips ( on insulin therapy )    To be used four times daily for glucose monitoring. 400 strip 3     ORDER FOR DME Equipment being ordered:     New home glucometer of choice  Test strips for new glucometer for three times daily monitoring--on insulin therapy    Anibal Vizcaino MD 1 Units 1     ORDER FOR DME Equipment being ordered: Lancets of choice for diabetic testing 300 each 5     pravastatin (PRAVACHOL) 10 MG tablet TAKE 1 TABLET BY MOUTH ONCE DAILY 90 tablet 2     aspirin 81 MG tablet Take 1 tablet by mouth daily. 100 tablet 3     multivitamin, therapeutic with minerals (MULTI-VITAMIN) TABS Take 1 tablet by mouth daily       Allergies   Allergen Reactions     Nkda [No Known Drug Allergies]      Recent Labs   Lab Test 02/14/19  1227 10/31/18  1154 07/27/18  1320 04/12/18  1123  08/04/17  1019   A1C 6.3* 6.3* 5.8* 6.2   < > 6.3*   LDL  --  54 37 42   < > 43  "  HDL  --  42 35* 40   < > 34*   TRIG  --  95 73 94   < > 72   ALT 21 21 22 16   < > 21   CR 1.61* 1.43* 1.47* 1.53*   < > 1.40*   GFRESTIMATED 39* 47* 46* 44*   < > 49*   GFRESTBLACK 45* 57* 55* 53*   < > 59*   POTASSIUM 4.2 4.5 5.0 4.8   < > 4.9   TSH  --  2.51  --   --   --  2.14    < > = values in this interval not displayed.      BP Readings from Last 3 Encounters:   02/14/19 160/60   11/06/18 134/50   08/02/18 126/58    Wt Readings from Last 3 Encounters:   02/14/19 111.6 kg (246 lb)   11/06/18 112 kg (247 lb)   08/02/18 110.5 kg (243 lb 9.6 oz)         ROS:  Constitutional, HEENT, cardiovascular, pulmonary, GI, , musculoskeletal, neuro, skin, endocrine and psych systems are negative, except as otherwise noted.    This document serves as a record of the services and decisions personally performed and made by uLcinda Garcia CNP. It was created on his/her behalf by Rene Ma, trained medical scribe. The creation of this document is based the provider's statements to the medical scribes.    Agnieszka Ma 12:15 PM, February 14, 2019      OBJECTIVE:     /60   Pulse 60   Temp 98.7  F (37.1  C) (Oral)   Resp 20   Ht 1.727 m (5' 8\")   Wt 111.6 kg (246 lb)   BMI 37.40 kg/m    Body mass index is 37.4 kg/m .     GENERAL: healthy, alert and no distress, pale- anemic appearing  EYES: Eyes grossly normal to inspection, PERRL and conjunctivae and sclerae normal  HENT: ear canals and TM's normal, nose and mouth without ulcers or lesions  NECK: no adenopathy, no asymmetry, masses, or scars and thyroid normal to palpation  RESP: lungs clear to auscultation - no rales, rhonchi or wheezes  CV: rate 60 with exertion- moving form chair to table, loud grade 4-5/6 murmur noted over pulmonic and aortic regions, occ. Missed beat  NEURO: Normal strength and tone, mentation intact and speech normal  PSYCH: mentation appears normal, affect frustrated at current situation, angered at provider with " "current recommnedations    Diagnostic Test Results:  No results found for this or any previous visit (from the past 24 hour(s)).    ASSESSMENT/PLAN:       ICD-10-CM    1. Type 2 diabetes mellitus with hyperglycemia, with long-term current use of insulin (H) E11.65 Comprehensive metabolic panel    Z79.4 Hemoglobin A1c   2. Macrocytic anemia D53.9 CBC with platelets and differential   3. Other cardiac arrhythmia I49.8    4. MDS (myelodysplastic syndrome) (H) D46.9 CBC with platelets and differential   5. Morbid obesity (H) E66.01    6. Hypertension goal BP (blood pressure) < 140/80 I10      Diabetes- controlled. Discussed avoidance of hypoglycemia- pt. Is working towards routine eating, checking BG levels BID. He is open to blood test today.       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 Pt. Declines eval. By cardiology- had been recommended PPM 12 years ago, declines even having the appt. Is aware of death risk, and has MDS- chronic and not wanting further interventions/evlauations. \" I do not want to give the specialist money or spend money on a procedure, I'd rather give it to my children.\" BP remained elevated as patient was quite agitated discussing the heart block and driving concerns. Will call cardiology to discuss options not wanting to adjust BP medications at this point.     Not wanting care coordination, of family conference- as they are not aware of his heart issues, and does not want to bother them with his health issues. I encouraged they would want to be participating with him as the are doing other hose cares for him as well.  He wants to continue driving, feels others pose a larger risk to society than he does. Wants to see another provider who will sign his driving forms, advised they are not likely able to to this as well. Discussed I would like to partner with his care going forward, even if he is not wanting a cardiology eval or further diagnostic studies.     We talked in " circles for quite awhile, and I was agreeable to filling current medications, but not adjusting BP meds due to heart risk and stated I no longer recommend he be driving. Exploring options for this.     Med check in 3 months.         LEOBARDO Nava [facility]

## 2019-02-06 DIAGNOSIS — I10 HYPERTENSION GOAL BP (BLOOD PRESSURE) < 140/80: ICD-10-CM

## 2019-02-07 RX ORDER — ENALAPRIL MALEATE 20 MG/1
TABLET ORAL
Qty: 30 TABLET | Refills: 0 | OUTPATIENT
Start: 2019-02-07

## 2019-02-07 NOTE — TELEPHONE ENCOUNTER
Enalapril    Sent 1/11/2019 with 3 month supply. Refill not appropriate at this time.     Ryann Ocampo, RN, BSN

## 2019-02-14 ENCOUNTER — OFFICE VISIT (OUTPATIENT)
Dept: FAMILY MEDICINE | Facility: CLINIC | Age: 84
End: 2019-02-14
Payer: COMMERCIAL

## 2019-02-14 DIAGNOSIS — E66.01 MORBID OBESITY (H): ICD-10-CM

## 2019-02-14 DIAGNOSIS — I10 HYPERTENSION GOAL BP (BLOOD PRESSURE) < 140/80: ICD-10-CM

## 2019-02-14 DIAGNOSIS — Z79.4 TYPE 2 DIABETES MELLITUS WITH HYPERGLYCEMIA, WITH LONG-TERM CURRENT USE OF INSULIN (H): Primary | ICD-10-CM

## 2019-02-14 DIAGNOSIS — D53.9 MACROCYTIC ANEMIA: ICD-10-CM

## 2019-02-14 DIAGNOSIS — D46.9 MDS (MYELODYSPLASTIC SYNDROME) (H): ICD-10-CM

## 2019-02-14 DIAGNOSIS — E11.65 TYPE 2 DIABETES MELLITUS WITH HYPERGLYCEMIA, WITH LONG-TERM CURRENT USE OF INSULIN (H): Primary | ICD-10-CM

## 2019-02-14 DIAGNOSIS — I49.8 OTHER CARDIAC ARRHYTHMIA: ICD-10-CM

## 2019-02-14 LAB
ALBUMIN SERPL-MCNC: 4.2 G/DL (ref 3.4–5)
ALP SERPL-CCNC: 63 U/L (ref 40–150)
ALT SERPL W P-5'-P-CCNC: 21 U/L (ref 0–70)
ANION GAP SERPL CALCULATED.3IONS-SCNC: 3 MMOL/L (ref 3–14)
AST SERPL W P-5'-P-CCNC: 21 U/L (ref 0–45)
BASOPHILS # BLD AUTO: 0.2 10E9/L (ref 0–0.2)
BASOPHILS NFR BLD AUTO: 1.4 %
BILIRUB SERPL-MCNC: 1.1 MG/DL (ref 0.2–1.3)
BUN SERPL-MCNC: 33 MG/DL (ref 7–30)
CALCIUM SERPL-MCNC: 9 MG/DL (ref 8.5–10.1)
CHLORIDE SERPL-SCNC: 110 MMOL/L (ref 94–109)
CO2 SERPL-SCNC: 30 MMOL/L (ref 20–32)
CREAT SERPL-MCNC: 1.61 MG/DL (ref 0.66–1.25)
DIFFERENTIAL METHOD BLD: ABNORMAL
EOSINOPHIL # BLD AUTO: 0.2 10E9/L (ref 0–0.7)
EOSINOPHIL NFR BLD AUTO: 1.2 %
ERYTHROCYTE [DISTWIDTH] IN BLOOD BY AUTOMATED COUNT: 20.1 % (ref 10–15)
GFR SERPL CREATININE-BSD FRML MDRD: 39 ML/MIN/{1.73_M2}
GLUCOSE SERPL-MCNC: 136 MG/DL (ref 70–99)
HBA1C MFR BLD: 6.3 % (ref 0–5.6)
HCT VFR BLD AUTO: 31.2 % (ref 40–53)
HGB BLD-MCNC: 10.2 G/DL (ref 13.3–17.7)
LYMPHOCYTES # BLD AUTO: 2.3 10E9/L (ref 0.8–5.3)
LYMPHOCYTES NFR BLD AUTO: 17.8 %
MCH RBC QN AUTO: 34.6 PG (ref 26.5–33)
MCHC RBC AUTO-ENTMCNC: 32.7 G/DL (ref 31.5–36.5)
MCV RBC AUTO: 106 FL (ref 78–100)
MONOCYTES # BLD AUTO: 1.1 10E9/L (ref 0–1.3)
MONOCYTES NFR BLD AUTO: 8.7 %
NEUTROPHILS # BLD AUTO: 9.3 10E9/L (ref 1.6–8.3)
NEUTROPHILS NFR BLD AUTO: 70.9 %
PLATELET # BLD AUTO: 189 10E9/L (ref 150–450)
POTASSIUM SERPL-SCNC: 4.2 MMOL/L (ref 3.4–5.3)
PROT SERPL-MCNC: 7.4 G/DL (ref 6.8–8.8)
RBC # BLD AUTO: 2.95 10E12/L (ref 4.4–5.9)
SODIUM SERPL-SCNC: 143 MMOL/L (ref 133–144)
WBC # BLD AUTO: 13.1 10E9/L (ref 4–11)

## 2019-02-14 PROCEDURE — 36415 COLL VENOUS BLD VENIPUNCTURE: CPT | Performed by: NURSE PRACTITIONER

## 2019-02-14 PROCEDURE — 99214 OFFICE O/P EST MOD 30 MIN: CPT | Performed by: NURSE PRACTITIONER

## 2019-02-14 PROCEDURE — 85025 COMPLETE CBC W/AUTO DIFF WBC: CPT | Performed by: NURSE PRACTITIONER

## 2019-02-14 PROCEDURE — 80053 COMPREHEN METABOLIC PANEL: CPT | Performed by: NURSE PRACTITIONER

## 2019-02-14 PROCEDURE — 83036 HEMOGLOBIN GLYCOSYLATED A1C: CPT | Performed by: NURSE PRACTITIONER

## 2019-02-14 SDOH — HEALTH STABILITY: MENTAL HEALTH: HOW OFTEN DO YOU HAVE A DRINK CONTAINING ALCOHOL?: NEVER

## 2019-02-14 ASSESSMENT — PAIN SCALES - GENERAL: PAINLEVEL: NO PAIN (0)

## 2019-02-14 ASSESSMENT — MIFFLIN-ST. JEOR: SCORE: 1785.35

## 2019-02-14 NOTE — Clinical Note
RICHARD Johnson, I had a tough visit with this patient. Any thoughts or recommendations would be great help. Thanks so much! I'm at [phone] today. Or staff message.  Lucinda Garcia

## 2019-02-14 NOTE — PATIENT INSTRUCTIONS
I am recommending you not drive until cleared by cardiology.     Re-check in 3 months.     I will refill your medication today.     I recommend a family care conference.     We have care coordinators available at [place] to assist with  Your home and transportation needs.

## 2019-02-19 VITALS
WEIGHT: 246 LBS | HEIGHT: 68 IN | SYSTOLIC BLOOD PRESSURE: 160 MMHG | RESPIRATION RATE: 20 BRPM | TEMPERATURE: 98.7 F | BODY MASS INDEX: 37.28 KG/M2 | HEART RATE: 60 BPM | DIASTOLIC BLOOD PRESSURE: 60 MMHG

## 2019-03-08 DIAGNOSIS — E11.65 TYPE 2 DIABETES MELLITUS WITH HYPERGLYCEMIA, WITH LONG-TERM CURRENT USE OF INSULIN (H): ICD-10-CM

## 2019-03-08 DIAGNOSIS — Z79.4 TYPE 2 DIABETES MELLITUS WITH HYPERGLYCEMIA, WITH LONG-TERM CURRENT USE OF INSULIN (H): ICD-10-CM

## 2019-03-08 NOTE — TELEPHONE ENCOUNTER
"Requested Prescriptions   Pending Prescriptions Disp Refills     HUMALOG KWIKPEN 100 UNIT/ML soln [Pharmacy Med Name: HumaLOG KwikPen Subcutaneous Solution Pen-injector 100 UNIT/ML] 15 mL 0     Sig: INJECT 15 UNITS SUBCUTANEOUSLY BEFORE BREAKFAST, LUNCH AND DINNER.    Short Acting Insulin Protocol Failed - 3/8/2019 12:44 PM       Failed - Blood pressure less than 140/90 in past 6 months    BP Readings from Last 3 Encounters:   02/14/19 160/60   11/06/18 134/50   08/02/18 126/58            Passed - LDL on file in past 12 months    Recent Labs   Lab Test 10/31/18  1154   LDL 54            Passed - Microalbumin on file in past 12 months    Recent Labs   Lab Test 10/31/18  1159   MICROL 125   UMALCR 99.21*            Passed - Serum creatinine on file in past 12 months    Recent Labs   Lab Test 02/14/19  1227   CR 1.61*            Passed - HgbA1C in past 3 or 6 months    If HgbA1C is 8 or greater, it needs to be on file within the past 3 months.  If less than 8, must be on file within the past 6 months.     Recent Labs   Lab Test 02/14/19  1227   A1C 6.3*          Passed - Medication is active on med list       Passed - Patient is age 18 or older       Passed - Recent (6 mo) or future (30 days) visit within the authorizing provider's specialty    Patient had office visit in the last 6 months or has a visit in the next 30 days with authorizing provider or within the authorizing provider's specialty.  See \"Patient Info\" tab in inbasket, or \"Choose Columns\" in Meds & Orders section of the refill encounter.              Last OV: 02/14/2019    Routing refill request to provider for review/approval because:  Most recent BP > goal    Erasto Lopez, RN, BSN        "

## 2019-03-09 DIAGNOSIS — E11.21 TYPE 2 DIABETES MELLITUS WITH DIABETIC NEPHROPATHY, WITHOUT LONG-TERM CURRENT USE OF INSULIN (H): ICD-10-CM

## 2019-03-11 ENCOUNTER — TELEPHONE (OUTPATIENT)
Dept: FAMILY MEDICINE | Facility: CLINIC | Age: 84
End: 2019-03-11

## 2019-03-11 RX ORDER — INSULIN LISPRO 100 [IU]/ML
INJECTION, SOLUTION INTRAVENOUS; SUBCUTANEOUS
Qty: 15 ML | Refills: 1 | Status: SHIPPED | OUTPATIENT
Start: 2019-03-11

## 2019-03-11 NOTE — TELEPHONE ENCOUNTER
Test Strip    Prescription approved per [facility] Refill Protocol.    Ryann Ocampo, RN, BSN

## 2019-03-11 NOTE — LETTER
March 11, 2019      Alex Armenta  [address]  [facility] [address]    The individual s 's license number - unavailable.     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 Death can occur at any point with this type of cardiac condition. It is recommend he no longer drive a vehicle/opeate heavy machinery until he is considered stable from Cardiologist. He is currently declining treatment of this issue.         Sincerely,        LEOBARDO Nava CNP

## 2019-04-30 DIAGNOSIS — E55.9 VITAMIN D DEFICIENCY: ICD-10-CM

## 2019-05-02 NOTE — TELEPHONE ENCOUNTER
Vitamin D  Routing refill request to provider for review/approval because:  Drug not on the [facility] refill protocol     Daria Perez, RN, BSN

## 2019-05-03 ENCOUNTER — TRANSFERRED RECORDS (OUTPATIENT)
Dept: HEALTH INFORMATION MANAGEMENT | Facility: CLINIC | Age: 84
End: 2019-05-03

## 2019-05-04 DIAGNOSIS — E55.9 VITAMIN D DEFICIENCY: ICD-10-CM

## 2019-05-06 NOTE — TELEPHONE ENCOUNTER
Routing refill request to provider for review/approval because:  Drug not on the [facility] refill protocol for this dose.    Govind Salguero, RN, BSN

## 2019-05-07 RX ORDER — METHOCARBAMOL 750 MG/1
TABLET ORAL
Qty: 12 CAPSULE | Refills: 0 | OUTPATIENT
Start: 2019-05-07

## 2019-05-08 ENCOUNTER — TELEPHONE (OUTPATIENT)
Dept: FAMILY MEDICINE | Facility: CLINIC | Age: 84
End: 2019-05-08

## 2019-05-08 NOTE — TELEPHONE ENCOUNTER
They are not in the Care Everywhere and he does not have an upcoming appt to request this.  I called them ([phone]) and left message for them to fax records.

## 2019-05-08 NOTE — TELEPHONE ENCOUNTER
"Hospital/TCU/ED for chronic condition Discharge Protocol    \"Hi, my name is Ryann Ocampo, a registered nurse, and I am calling from [facility].  I am calling to follow up and see how things are going for you after your recent emergency visit/hospital/TCU stay.\"    Tell me how you are doing now that you are home?\" doing pretty well      Discharge Instructions    \"Let's review your discharge instructions.  What is/are the follow-up recommendations?  Pt. Response: No question    \"Has an appointment with your primary care provider been scheduled?\"   Pt has not decided who his doctor will be     \"When you see the provider, I would recommend that you bring your medications with you.\"    Medications    \"Tell me what changed about your medicines when you discharged?\"    Changes to chronic meds?    0-1    \"What questions do you have about your medications?\"    None       Call Summary    \"What questions or concerns do you have about your recent visit and your follow-up care?\"     none    \"If you have questions or things don't continue to improve, we encourage you contact us through the main clinic number (give number).  Even if the clinic is not open, triage nurses are available 24/7 to help you.     We would like you to know that our clinic has extended hours (provide information).  We also have urgent care (provide details on closest location and hours/contact info)\"    Offered to schedule OV with pt, he declined. Prefers a male doctor he said he is set on doctors appts for the next couple of weeks but will call for an OV soon. Discussed Vit d with him and he said he has enough for 3 weeks, would like to finish those then he will consider the 1000u/day      \"Thank you for your time and take care!\"             "

## 2019-05-08 NOTE — TELEPHONE ENCOUNTER
Call pt. Hospital follow-up: Dual chamber pacemaker insertion at [facility].     Ask for summary, and what is needed for Follow-up- Is patient following up with [facility] or outside?    Also was on high dose Vitamin D from Dr. Vizcaino, Can stop this and do daily supplement at 1,000iu/day but recommend follow-up blood work for levels.     He has outside cardiology set up for May 14 or 15.     Lucinda Garcia

## 2019-05-09 NOTE — TELEPHONE ENCOUNTER
[facility] called back, they would like us to send the records request on [employer] fax request: [phone] Note: recent hospital records.  I faxed the request

## 2019-05-20 DIAGNOSIS — I10 HYPERTENSION GOAL BP (BLOOD PRESSURE) < 140/80: ICD-10-CM

## 2019-05-21 RX ORDER — ENALAPRIL MALEATE 20 MG/1
20 TABLET ORAL DAILY
Qty: 30 TABLET | Refills: 0 | Status: SHIPPED | OUTPATIENT
Start: 2019-05-21

## 2019-05-21 RX ORDER — ENALAPRIL MALEATE 20 MG/1
TABLET ORAL
Qty: 30 TABLET | Refills: 0 | Status: SHIPPED | OUTPATIENT
Start: 2019-05-21

## 2019-05-21 NOTE — TELEPHONE ENCOUNTER
[facility]  Routing refill request to provider for review/approval because:  Labs out of range:  BP, creatinine  Appt has not been scheduled.     Daria Perez, RN, BSN

## 2019-05-21 NOTE — TELEPHONE ENCOUNTER
Pt informed.  He will call back to schedule the appt.  Please send Rx to [facility] in [place].

## 2019-05-21 NOTE — TELEPHONE ENCOUNTER
Call pt. Needs follow-up since pacemaker placed. Please ask where he would like refills directed. I will given #30, but needs follow-up visit for further refills. Lucinda Garcia

## 2019-07-13 DIAGNOSIS — E11.21 TYPE 2 DIABETES MELLITUS WITH DIABETIC NEPHROPATHY, WITHOUT LONG-TERM CURRENT USE OF INSULIN (H): ICD-10-CM

## 2019-07-15 NOTE — TELEPHONE ENCOUNTER
Pending Prescriptions:                       Disp   Refills    ONETOUCH ULTRA test strip [Pharmacy Med N*100 ea*1            Sig: use 1 strip to test 4 times daily      Medication is being filled for 1 time refill only due to:  Patient needs to be seen because LOV 2/14/2019, 3 months.     Please help schedule    Ryann Ocampo RN, BSN

## 2019-10-01 DIAGNOSIS — E11.65 TYPE 2 DIABETES MELLITUS WITH HYPERGLYCEMIA, WITH LONG-TERM CURRENT USE OF INSULIN (H): ICD-10-CM

## 2019-10-01 DIAGNOSIS — Z79.4 TYPE 2 DIABETES MELLITUS WITH HYPERGLYCEMIA, WITH LONG-TERM CURRENT USE OF INSULIN (H): ICD-10-CM

## 2019-10-01 RX ORDER — METFORMIN HYDROCHLORIDE 750 MG/1
TABLET, EXTENDED RELEASE ORAL
Qty: 180 TABLET | Refills: 1 | Status: CANCELLED | OUTPATIENT
Start: 2019-10-01

## 2019-10-01 NOTE — TELEPHONE ENCOUNTER
Pending Prescriptions:                       Disp   Refills    metFORMIN (GLUCOPHAGE-XR) 750 MG 24 hr ta*180 ta*1          Goes to [facility] now    Ryann Ocampo RN, BSN

## 2019-10-16 DIAGNOSIS — E78.5 HYPERLIPIDEMIA LDL GOAL <100: ICD-10-CM

## 2019-10-16 RX ORDER — PRAVASTATIN SODIUM 10 MG
TABLET ORAL
Qty: 30 TABLET | Refills: 0 | Status: SHIPPED | OUTPATIENT
Start: 2019-10-16

## 2019-10-16 NOTE — TELEPHONE ENCOUNTER
Pending Prescriptions:                       Disp   Refills    pravastatin (PRAVACHOL) 10 MG tablet [Pha*30 tab*0            Sig: TAKE 1 TABLET BY MOUTH ONCE DAILY    Medication is being filled for 1 time refill only due to:  Patient needs to be seen because with fasting labs.     Please help schedule    Ryann Ocampo RN, BSN

## 2023-07-28 NOTE — TELEPHONE ENCOUNTER
Letter mailed to pt. And [facility], advising to no longer be driving a vehicle.   . Lucinda Garcia   
[EKG obtained to assist in diagnosis and management of assessed problem(s)] : EKG obtained to assist in diagnosis and management of assessed problem(s)